# Patient Record
Sex: FEMALE
[De-identification: names, ages, dates, MRNs, and addresses within clinical notes are randomized per-mention and may not be internally consistent; named-entity substitution may affect disease eponyms.]

---

## 2017-11-24 ENCOUNTER — HOSPITAL ENCOUNTER (EMERGENCY)
Dept: HOSPITAL 5 - ED | Age: 57
LOS: 1 days | Discharge: HOME | End: 2017-11-25
Payer: COMMERCIAL

## 2017-11-24 DIAGNOSIS — M19.90: ICD-10-CM

## 2017-11-24 DIAGNOSIS — F17.200: ICD-10-CM

## 2017-11-24 DIAGNOSIS — J40: ICD-10-CM

## 2017-11-24 DIAGNOSIS — Z88.1: ICD-10-CM

## 2017-11-24 DIAGNOSIS — N39.0: Primary | ICD-10-CM

## 2017-11-24 DIAGNOSIS — G43.909: ICD-10-CM

## 2017-11-24 DIAGNOSIS — Z86.73: ICD-10-CM

## 2017-11-24 DIAGNOSIS — Z79.82: ICD-10-CM

## 2017-11-24 LAB
ANION GAP SERPL CALC-SCNC: 17 MMOL/L
BACTERIA #/AREA URNS HPF: (no result) /HPF
BASOPHILS NFR BLD AUTO: 0.8 % (ref 0–1.8)
BILIRUB UR QL STRIP: (no result)
BLOOD UR QL VISUAL: (no result)
BUN SERPL-MCNC: 10 MG/DL (ref 7–17)
BUN/CREAT SERPL: 14 %
CALCIUM SERPL-MCNC: 9.2 MG/DL (ref 8.4–10.2)
CHLORIDE SERPL-SCNC: 103.2 MMOL/L (ref 98–107)
CO2 SERPL-SCNC: 26 MMOL/L (ref 22–30)
EOSINOPHIL NFR BLD AUTO: 3.5 % (ref 0–4.3)
GLUCOSE SERPL-MCNC: 88 MG/DL (ref 65–100)
HCT VFR BLD CALC: 38.4 % (ref 30.3–42.9)
HGB BLD-MCNC: 13.2 GM/DL (ref 10.1–14.3)
KETONES UR STRIP-MCNC: (no result) MG/DL
LEUKOCYTE ESTERASE UR QL STRIP: (no result)
MCH RBC QN AUTO: 33 PG (ref 28–32)
MCHC RBC AUTO-ENTMCNC: 35 % (ref 30–34)
MCV RBC AUTO: 96 FL (ref 79–97)
MUCOUS THREADS #/AREA URNS HPF: (no result) /HPF
NITRITE UR QL STRIP: (no result)
PH UR STRIP: 6 [PH] (ref 5–7)
PLATELET # BLD: 352 K/MM3 (ref 140–440)
POTASSIUM SERPL-SCNC: 4.2 MMOL/L (ref 3.6–5)
PROT UR STRIP-MCNC: (no result) MG/DL
RBC # BLD AUTO: 4.01 M/MM3 (ref 3.65–5.03)
RBC #/AREA URNS HPF: 4 /HPF (ref 0–6)
SODIUM SERPL-SCNC: 142 MMOL/L (ref 137–145)
UROBILINOGEN UR-MCNC: 4 MG/DL (ref ?–2)
WBC # BLD AUTO: 10.3 K/MM3 (ref 4.5–11)
WBC #/AREA URNS HPF: 5 /HPF (ref 0–6)

## 2017-11-24 PROCEDURE — 84484 ASSAY OF TROPONIN QUANT: CPT

## 2017-11-24 PROCEDURE — 99284 EMERGENCY DEPT VISIT MOD MDM: CPT

## 2017-11-24 PROCEDURE — 82550 ASSAY OF CK (CPK): CPT

## 2017-11-24 PROCEDURE — 36415 COLL VENOUS BLD VENIPUNCTURE: CPT

## 2017-11-24 PROCEDURE — 85025 COMPLETE CBC W/AUTO DIFF WBC: CPT

## 2017-11-24 PROCEDURE — 87400 INFLUENZA A/B EACH AG IA: CPT

## 2017-11-24 PROCEDURE — 96374 THER/PROPH/DIAG INJ IV PUSH: CPT

## 2017-11-24 PROCEDURE — 71020: CPT

## 2017-11-24 PROCEDURE — 82553 CREATINE MB FRACTION: CPT

## 2017-11-24 PROCEDURE — 80048 BASIC METABOLIC PNL TOTAL CA: CPT

## 2017-11-24 PROCEDURE — 81001 URINALYSIS AUTO W/SCOPE: CPT

## 2017-11-24 PROCEDURE — 96375 TX/PRO/DX INJ NEW DRUG ADDON: CPT

## 2017-11-24 NOTE — EMERGENCY DEPARTMENT REPORT
- General


Chief Complaint: Nausea/Vomiting/Diarrhea


Stated Complaint: BODYACHES; DIZZINESS


Time Seen by Provider: 11/24/17 23:33


Source: patient


Mode of arrival: Ambulatory


Limitations: No Limitations





- History of Present Illness


Initial Comments: 





58 YO FEMALE  WHO WORKS AT A  AND HAD MANY SICK CONTACTS LAST WEEK IS 

TODAY C/O PRODUCTIVE YELLOW SPUTUM  COUGHING, SORE THROAT, AND DRY HEAVING. SHE 

HAS NOT ACTUALLY VOMITED. SHE HAS NO CHEST PAIN, NO FEVER BUT ADMITS TO BODY 

ACHES.





- Related Data


 Home Medications











 Medication  Instructions  Recorded  Confirmed  Last Taken


 


Aspirin EC [Aspirin Enteric Coated 81 mg PO QDAY 06/09/15 11/18/15 Unknown





TAB]    








 Previous Rx's











 Medication  Instructions  Recorded  Last Taken  Type


 


Acetaminophen/Codeine [Tylenol #3] 1 tab PO Q6H PRN #20 tab 02/22/16 Unknown Rx


 


Cyclobenzaprine [Flexeril] 10 mg PO TID PRN #20 tablet 02/22/16 Unknown Rx


 


Naproxen [Naprosyn TAB] 500 mg PO BID #30 tablet 02/22/16 Unknown Rx


 


Azithromycin [Zithromax Z-MODE] 250 mg PO QDAY #6 tablet 06/14/16 Unknown Rx


 


Ibuprofen [Motrin] 800 mg PO Q8HR PRN #10 tablet 06/14/16 Unknown Rx


 


Promethazine /Codeine 5 ml PO Q6H PRN #90 ml 06/14/16 Unknown Rx





[Phenergan/Codeine 6.25-10 mg/5Ml]    


 


Cyclobenzaprine HCl [Flexeril 5 MG 5 mg PO TID #20 tab 08/26/16 Unknown Rx





TAB]    


 


Ibuprofen [Motrin 800 MG tab] 800 mg PO Q8HR PRN #20 tablet 08/26/16 Unknown Rx


 


ALBUTEROL Inhaler [Proair] 2 puff IH QID PRN #1 inhalation 11/25/17 Unknown Rx


 


Levofloxacin [Levaquin] 750 mg PO QDAY #7 tablet 11/25/17 Unknown Rx











 Allergies











Allergy/AdvReac Type Severity Reaction Status Date / Time


 


ampicillin Allergy  Itching Verified 08/18/15 20:17














ED Review of Systems


ROS: 


Stated complaint: BODYACHES; DIZZINESS


Other details as noted in HPI





Constitutional: denies: chills, fever


Eyes: denies: eye pain, eye discharge, vision change


ENT: throat pain.  denies: ear pain


Respiratory: denies: cough, shortness of breath, wheezing


Cardiovascular: denies: chest pain, palpitations


Endocrine: no symptoms reported


Gastrointestinal: nausea.  denies: abdominal pain, vomiting, diarrhea


Genitourinary: denies: urgency, dysuria, discharge


Musculoskeletal: arthralgia, myalgia.  denies: joint swelling


Skin: denies: rash, lesions


Neurological: denies: headache, weakness, paresthesias


Psychiatric: denies: anxiety, depression


Hematological/Lymphatic: denies: easy bleeding, easy bruising





ED Past Medical Hx





- Past Medical History


Hx CVA: Yes (2010)


Hx Congestive Heart Failure: No


Hx Diabetes: No


Hx Arthritis: Yes


Hx Headaches / Migraines: Yes


Hx Psychiatric Treatment: Yes (depression)


Hx Asthma: No


Hx COPD: No


Hx HIV: No





- Surgical History


Hx Appendectomy: Yes


Additional Surgical History: hysterectomy, left ankle surgery, left carotid 

endarterectomy





- Social History


Smoking Status: Current Every Day Smoker


Substance Use Type: None





- Medications


Home Medications: 


 Home Medications











 Medication  Instructions  Recorded  Confirmed  Last Taken  Type


 


Aspirin EC [Aspirin Enteric Coated 81 mg PO QDAY 06/09/15 11/18/15 Unknown 

History





TAB]     


 


Acetaminophen/Codeine [Tylenol #3] 1 tab PO Q6H PRN #20 tab 02/22/16  Unknown Rx


 


Cyclobenzaprine [Flexeril] 10 mg PO TID PRN #20 tablet 02/22/16  Unknown Rx


 


Naproxen [Naprosyn TAB] 500 mg PO BID #30 tablet 02/22/16  Unknown Rx


 


Azithromycin [Zithromax Z-MODE] 250 mg PO QDAY #6 tablet 06/14/16  Unknown Rx


 


Ibuprofen [Motrin] 800 mg PO Q8HR PRN #10 tablet 06/14/16  Unknown Rx


 


Promethazine /Codeine 5 ml PO Q6H PRN #90 ml 06/14/16  Unknown Rx





[Phenergan/Codeine 6.25-10 mg/5Ml]     


 


Cyclobenzaprine HCl [Flexeril 5 MG 5 mg PO TID #20 tab 08/26/16  Unknown Rx





TAB]     


 


Ibuprofen [Motrin 800 MG tab] 800 mg PO Q8HR PRN #20 tablet 08/26/16  Unknown Rx


 


ALBUTEROL Inhaler [Proair] 2 puff IH QID PRN #1 inhalation 11/25/17  Unknown Rx


 


Levofloxacin [Levaquin] 750 mg PO QDAY #7 tablet 11/25/17  Unknown Rx














ED Physical Exam





- General


Limitations: No Limitations


General appearance: alert, in no apparent distress





- Head


Head exam: Present: atraumatic, normocephalic





- Eye


Eye exam: Present: normal appearance





- ENT


ENT exam: Present: mucous membranes moist





- Neck


Neck exam: Present: normal inspection, full ROM





- Respiratory


Respiratory exam: Present: normal lung sounds bilaterally.  Absent: respiratory 

distress, wheezes





- Cardiovascular


Cardiovascular Exam: Present: regular rate, normal rhythm, normal heart sounds.

  Absent: systolic murmur, diastolic murmur, rubs, gallop





- GI/Abdominal


GI/Abdominal exam: Present: soft, normal bowel sounds.  Absent: distended, 

tenderness, guarding





- Rectal


Rectal exam: Present: deferred





- Extremities Exam


Extremities exam: Present: normal inspection, full ROM





- Back Exam


Back exam: Present: normal inspection, full ROM





- Neurological Exam


Neurological exam: Present: alert, oriented X3, CN II-XII intact





- Psychiatric


Psychiatric exam: Present: normal affect, normal mood





- Skin


Skin exam: Present: warm, dry, intact, normal color.  Absent: rash





ED Course


 Vital Signs











  11/24/17 11/25/17 11/25/17





  21:12 00:30 00:31


 


Temperature 98 F 98 F 


 


Pulse Rate 74 98 H 


 


Respiratory 18 18 18





Rate   


 


Blood Pressure 139/82  


 


Blood Pressure  130/76 





[Left]   


 


O2 Sat by Pulse 100 96 





Oximetry   














- Reevaluation(s)


Reevaluation #1: 





11/25/17 01:31


FLU STILL UNCOLLECTED











ED Medical Decision Making





- Lab Data


Result diagrams: 


 11/24/17 21:29





 11/24/17 21:29





- Radiology Data


Radiology results: pending (CXR; NEGATIVE), image reviewed


Critical care attestation.: 


If time is entered above; I have spent that time in minutes in the direct care 

of this critically ill patient, excluding procedure time.








ED Disposition


Clinical Impression: 


 UTI (urinary tract infection)





Acute bronchitis


Qualifiers:


 Bronchitis organism: unspecified organism Qualified Code(s): J20.9 - Acute 

bronchitis, unspecified





Disposition: DC-01 TO HOME OR SELFCARE


Is pt being admited?: No


Does the pt Need Aspirin: No


Condition: Stable


Instructions:  Urinary Tract Infection in Women (ED), Acute Bronchitis (ED)


Prescriptions: 


ALBUTEROL Inhaler [Proair] 2 puff IH QID PRN #1 inhalation


 PRN Reason: Shortness Of Breath


Levofloxacin [Levaquin] 750 mg PO QDAY #7 tablet


Referrals: 


PRIMARY CARE,MD [Primary Care Provider] - 3-5 Days


[facility] [Outside] - 3-5 Days


Time of Disposition: 02:20

## 2017-11-25 VITALS — SYSTOLIC BLOOD PRESSURE: 137 MMHG | DIASTOLIC BLOOD PRESSURE: 89 MMHG

## 2017-11-25 LAB — CK SERPL-CCNC: 208 UNITS/L (ref 30–135)

## 2017-11-25 NOTE — XRAY REPORT
FINAL REPORT



EXAM:  XR CHEST ROUTINE 2V



HISTORY:  COUGHING?PNEUMONIA 



TECHNIQUE:  PA and lateral views of the chest were submitted.

There are no previous studies available for comparison.



FINDINGS:  

Heart size and mediastinum appear normal. The lungs are clear.

Pleural fluid is not seen. The bones soft tissues are well

maintained.



IMPRESSION:  

No active chest disease.

## 2017-12-10 ENCOUNTER — HOSPITAL ENCOUNTER (EMERGENCY)
Dept: HOSPITAL 5 - ED | Age: 57
Discharge: HOME | End: 2017-12-10
Payer: COMMERCIAL

## 2017-12-10 VITALS — DIASTOLIC BLOOD PRESSURE: 82 MMHG | SYSTOLIC BLOOD PRESSURE: 136 MMHG

## 2017-12-10 DIAGNOSIS — M25.562: ICD-10-CM

## 2017-12-10 DIAGNOSIS — N30.00: Primary | ICD-10-CM

## 2017-12-10 DIAGNOSIS — Z79.82: ICD-10-CM

## 2017-12-10 DIAGNOSIS — M19.90: ICD-10-CM

## 2017-12-10 DIAGNOSIS — Z86.73: ICD-10-CM

## 2017-12-10 DIAGNOSIS — F17.200: ICD-10-CM

## 2017-12-10 DIAGNOSIS — M25.522: ICD-10-CM

## 2017-12-10 DIAGNOSIS — G43.909: ICD-10-CM

## 2017-12-10 DIAGNOSIS — E11.40: ICD-10-CM

## 2017-12-10 DIAGNOSIS — M25.521: ICD-10-CM

## 2017-12-10 DIAGNOSIS — M25.561: ICD-10-CM

## 2017-12-10 DIAGNOSIS — Z88.1: ICD-10-CM

## 2017-12-10 LAB
BACTERIA #/AREA URNS HPF: (no result) /HPF
BILIRUB UR QL STRIP: (no result)
BLOOD UR QL VISUAL: (no result)
KETONES UR STRIP-MCNC: (no result) MG/DL
LEUKOCYTE ESTERASE UR QL STRIP: (no result)
NITRITE UR QL STRIP: (no result)
PH UR STRIP: 6 [PH] (ref 5–7)
PROT UR STRIP-MCNC: (no result) MG/DL
RBC #/AREA URNS HPF: 1 /HPF (ref 0–6)
URINE DRUGS OF ABUSE NOTE: (no result)
UROBILINOGEN UR-MCNC: < 2 MG/DL (ref ?–2)
WBC #/AREA URNS HPF: 3 /HPF (ref 0–6)

## 2017-12-10 PROCEDURE — 96372 THER/PROPH/DIAG INJ SC/IM: CPT

## 2017-12-10 PROCEDURE — 99283 EMERGENCY DEPT VISIT LOW MDM: CPT

## 2017-12-10 PROCEDURE — 81001 URINALYSIS AUTO W/SCOPE: CPT

## 2017-12-10 PROCEDURE — 80307 DRUG TEST PRSMV CHEM ANLYZR: CPT

## 2017-12-10 NOTE — EMERGENCY DEPARTMENT REPORT
ED General Adult HPI





- General


Chief complaint: Pain General


Stated complaint: KNEE PAIN


Time Seen by Provider: 12/10/17 16:13


Source: patient


Mode of arrival: Ambulatory


Limitations: No Limitations





- History of Present Illness


Initial comments: 





Patient here complaining in that she has continued ongoing chronic pain to her 

back, neck, elbows, legs, knees with occasional tingling .  Patient has chronic 

neuropathy and she says she has very bad arthritis with osteoporosis.  She said 

she started having pain a couple days it's been getting worse pain is 10 out of 

10 generalized to her joints.  Denies any trauma.  Patient was here on 2017 and treated for urinary tract infection which she says she took all the 

medication that she was given for 7 days.  Denies any urinary burning, 

frequency or urgency.  Denies any nausea or vomiting.  Denies any fever or 

chills.  Pain is 10 out of 10 to joints and aching.  Better with resting.  

Worse with moving around.  She has no fever or chills.


MD Complaint: generalized aching 


Onset/Timin


-: days(s)


Location: back, left, right, upper extremity, lower extremity


Radiation: non-radiation


Severity scale (0 -10): 10


Quality: aching


Consistency: constant


Improves with: immobilization, rest


Worsens with: movement


Associated Symptoms: denies: confusion, chest pain, cough, diaphoresis, fever/

chills, headaches, loss of appetite, malaise, nausea/vomiting, rash, seizure, 

shortness of breath, syncope, weakness


Treatments Prior to Arrival: none





- Related Data


 Home Medications











 Medication  Instructions  Recorded  Confirmed  Last Taken


 


Aspirin EC [Aspirin Enteric Coated 81 mg PO QDAY 06/09/15 11/18/15 Unknown





TAB]    








 Previous Rx's











 Medication  Instructions  Recorded  Last Taken  Type


 


Acetaminophen/Codeine [Tylenol #3] 1 tab PO Q6H PRN #20 tab 16 Unknown Rx


 


Cyclobenzaprine [Flexeril] 10 mg PO TID PRN #20 tablet 16 Unknown Rx


 


Naproxen [Naprosyn TAB] 500 mg PO BID #30 tablet 16 Unknown Rx


 


Azithromycin [Zithromax Z-MODE] 250 mg PO QDAY #6 tablet 16 Unknown Rx


 


Ibuprofen [Motrin] 800 mg PO Q8HR PRN #10 tablet 16 Unknown Rx


 


Promethazine /Codeine 5 ml PO Q6H PRN #90 ml 16 Unknown Rx





[Phenergan/Codeine 6.25-10 mg/5Ml]    


 


Cyclobenzaprine HCl [Flexeril 5 MG 5 mg PO TID #20 tab 16 Unknown Rx





TAB]    


 


Ibuprofen [Motrin 800 MG tab] 800 mg PO Q8HR PRN #20 tablet 16 Unknown Rx


 


ALBUTEROL Inhaler [Proair] 2 puff IH QID PRN #1 inhalation 17 Unknown Rx


 


Levofloxacin [Levaquin] 750 mg PO QDAY #7 tablet 17 Unknown Rx


 


Nitrofurantoin Monohyd/M-Cryst 100 mg PO Q12H 3 Days #6 capsule 12/10/17 

Unknown Rx





[Macrobid 100 mg Capsule]    


 


traMADol [Ultram] 50 mg PO Q6HR PRN 3 Days #12 tablet 12/10/17 Unknown Rx











 Allergies











Allergy/AdvReac Type Severity Reaction Status Date / Time


 


ampicillin Allergy  Itching Verified 08/18/15 20:17














ED Review of Systems


ROS: 


Stated complaint: KNEE PAIN


Other details as noted in HPI





Comment: All other systems reviewed and negative


Constitutional: no symptoms reported


Respiratory: no symptoms reported


Cardiovascular: denies: chest pain, palpitations, dyspnea on exertion, orthopnea

, edema, syncope, paroxysmal nocturnal dyspnea


Endocrine: no symptoms reported


Gastrointestinal: denies: abdominal pain, nausea, vomiting, diarrhea


Genitourinary: denies: urgency, dysuria, frequency, hematuria, discharge, 

abnormal menses


Musculoskeletal: back pain, arthralgia.  denies: joint swelling, myalgia


Skin: denies: rash, lesions, pruritus


Neurological: paresthesias.  denies: headache, weakness, numbness, confusion, 

abnormal gait, vertigo





ED Past Medical Hx





- Past Medical History


Previous Medical History?: Yes


Hx CVA: Yes ()


Hx Congestive Heart Failure: No


Hx Diabetes: No


Hx Arthritis: Yes


Hx Headaches / Migraines: Yes


Hx Psychiatric Treatment: Yes (depression)


Hx Asthma: No


Hx COPD: No


Hx HIV: No





- Surgical History


Past Surgical History?: Yes


Hx Appendectomy: Yes


Additional Surgical History: hysterectomy, left ankle surgery, left carotid 

endarterectomy





- Family History


Family history: hypertension





- Social History


Smoking Status: Current Every Day Smoker


Substance Use Type: None





- Medications


Home Medications: 


 Home Medications











 Medication  Instructions  Recorded  Confirmed  Last Taken  Type


 


Aspirin EC [Aspirin Enteric Coated 81 mg PO QDAY 06/09/15 11/18/15 Unknown 

History





TAB]     


 


Acetaminophen/Codeine [Tylenol #3] 1 tab PO Q6H PRN #20 tab 16  Unknown Rx


 


Cyclobenzaprine [Flexeril] 10 mg PO TID PRN #20 tablet 16  Unknown Rx


 


Naproxen [Naprosyn TAB] 500 mg PO BID #30 tablet 16  Unknown Rx


 


Azithromycin [Zithromax Z-MODE] 250 mg PO QDAY #6 tablet 16  Unknown Rx


 


Ibuprofen [Motrin] 800 mg PO Q8HR PRN #10 tablet 16  Unknown Rx


 


Promethazine /Codeine 5 ml PO Q6H PRN #90 ml 16  Unknown Rx





[Phenergan/Codeine 6.25-10 mg/5Ml]     


 


Cyclobenzaprine HCl [Flexeril 5 MG 5 mg PO TID #20 tab 16  Unknown Rx





TAB]     


 


Ibuprofen [Motrin 800 MG tab] 800 mg PO Q8HR PRN #20 tablet 16  Unknown Rx


 


ALBUTEROL Inhaler [Proair] 2 puff IH QID PRN #1 inhalation 17  Unknown Rx


 


Levofloxacin [Levaquin] 750 mg PO QDAY #7 tablet 17  Unknown Rx


 


Nitrofurantoin Monohyd/M-Cryst 100 mg PO Q12H 3 Days #6 capsule 12/10/17  

Unknown Rx





[Macrobid 100 mg Capsule]     


 


traMADol [Ultram] 50 mg PO Q6HR PRN 3 Days #12 tablet 12/10/17  Unknown Rx














ED Physical Exam





- General


Limitations: No Limitations


General appearance: alert, in no apparent distress





- Head


Head exam: Present: atraumatic, normocephalic, normal inspection





- Eye


Eye exam: Present: normal appearance, PERRL, EOMI.  Absent: periorbital swelling

, periorbital tenderness


Pupils: Present: normal accommodation





- ENT


ENT exam: Present: normal exam, normal orophraynx, mucous membranes moist





- Neck


Neck exam: Present: normal inspection, full ROM, other (no C-spine tenderness).

  Absent: tenderness, meningismus, lymphadenopathy, thyromegaly





- Respiratory


Respiratory exam: Present: normal lung sounds bilaterally.  Absent: respiratory 

distress, chest wall tenderness, accessory muscle use





- Cardiovascular


Cardiovascular Exam: Present: regular rate, normal rhythm, normal heart sounds.

  Absent: systolic murmur, diastolic murmur





- GI/Abdominal


GI/Abdominal exam: Present: soft, normal bowel sounds.  Absent: distended, 

tenderness, guarding, rebound, rigid, organomegaly, mass, bruit, pulsatile mass

, hernia





- Extremities Exam


Extremities exam: Present: normal inspection, full ROM, normal capillary refill

, other (MSK: Strength 5/5 in all extremities.  No joint deformity or crepitus.

  Normal inspection.  Full range of motion to all extremities).  Absent: 

tenderness, pedal edema, joint swelling, calf tenderness





- Back Exam


Back exam: Present: normal inspection, full ROM, other (patient ambulates 

without any difficulties).  Absent: tenderness, CVA tenderness (R), CVA 

tenderness (L), muscle spasm, paraspinal tenderness, vertebral tenderness, rash 

noted





- Neurological Exam


Neurological exam: Present: alert, oriented X3, normal gait, reflexes normal.  

Absent: motor sensory deficit





- Psychiatric


Psychiatric exam: Present: normal affect, normal mood





- Skin


Skin exam: Present: warm, dry, intact, normal color.  Absent: rash





ED Course


 Vital Signs











  12/10/17





  13:49


 


Temperature 97.7 F


 


Pulse Rate 87


 


Respiratory 18





Rate 


 


Blood Pressure 136/82


 


O2 Sat by Pulse 99





Oximetry 














- Reevaluation(s)


Reevaluation #1: 





12/10/17 18:00


 given Toradol 30 mg IM in emergency room for generalized joint pain.  Her 

family members here so she will be given additional Percocet 5/325 2 tablets.














ED Medical Decision Making





- Lab Data








 Lab Results











  12/10/17 12/10/17 Range/Units





  14:39 14:39 


 


Urine Color  Yellow   (Yellow)  


 


Urine Turbidity  Clear   (Clear)  


 


Urine pH  6.0   (5.0-7.0)  


 


Ur Specific Gravity  1.014   (1.003-1.030)  


 


Urine Protein  <15 mg/dl   (Negative)  mg/dL


 


Urine Glucose (UA)  Neg   (Negative)  mg/dL


 


Urine Ketones  Neg   (Negative)  mg/dL


 


Urine Blood  Neg   (Negative)  


 


Urine Nitrite  Neg   (Negative)  


 


Ur Reducing Substances  Not Reportable   


 


Urine Bilirubin  Neg   (Negative)  


 


Urine Ictotest  Not Reportable   


 


Urine Urobilinogen  < 2.0   (<2.0)  mg/dL


 


Ur Leukocyte Esterase  Sm   (Negative)  


 


Urine WBC (Auto)  3.0   (0.0-6.0)  /HPF


 


Urine RBC (Auto)  1.0   (0.0-6.0)  /HPF


 


U Epithel Cells (Auto)  3.0   (0-13.0)  /HPF


 


Urine Bacteria (Auto)  1+   (Negative)  /HPF


 


Urine Opiates Screen   Presumptive negative  


 


Urine Methadone Screen   Presumptive negative  


 


Ur Barbiturates Screen   Presumptive negative  


 


Ur Phencyclidine Scrn   Presumptive negative  


 


Ur Amphetamines Screen   Presumptive negative  


 


U Benzodiazepines Scrn   Presumptive negative  


 


Urine Cocaine Screen   Presumptive negative  


 


U Marijuana (THC) Screen   Presumptive negative  


 


Drugs of Abuse Note   Disclamer  








Urine culture pending





- Medical Decision Making





ED course: For management of her chronic pain.  She says she has chronic 

arthritis and she is having a flareup and that she has osteoporosis.  She was 

treated here on 2017 for urinary tract infection and she is asymptomatic 

at present but her urine shows that she has some bacteria and trace amount of 

leukocyte Estrace.  Based on this I will treat her with Macrobid 3 days and I 

gave her Toradol 30 mg IM in emergency room.  Her family member came and I gave 

her additional Percocet 5/325 2 tablets emergency room and discharged home with 

prescription for Ultram and Macrobid to follow up with  her primary care 

physician to manage her chronic medical problems.  This was discussed patient.  

I gave her results of urinalysis and told her that culture was sent.  Patient 

discharged home with her family in stable condition and discharged home and 

referred to [facility] at her request for primary care.


Critical care attestation.: 


If time is entered above; I have spent that time in minutes in the direct care 

of this critically ill patient, excluding procedure time.








ED Disposition


Clinical Impression: 


 Acute cystitis without hematuria, Arthralgia of multiple sites, bilateral





Disposition: DC-01 TO HOME OR SELFCARE


Is pt being admited?: No


Does the pt Need Aspirin: No


Condition: Stable


Instructions:  Chronic Pain (ED), Osteoarthritis (ED), Urinary Tract Infection 

in Children (ED)


Additional Instructions: 


 follow-up with outside Medical Center as instructed


Please increase your fluid intake


Take Ultram for pain but please do not drive or operate heavy machinery as this 

medication causes drowsiness


Take  Macrobid for small bladder infection


Prescriptions: 


Nitrofurantoin Monohyd/M-Cryst [Macrobid 100 mg Capsule] 100 mg PO Q12H 3 Days #

6 capsule


traMADol [Ultram] 50 mg PO Q6HR PRN 3 Days #12 tablet


 PRN Reason: Pain


Referrals: 


PRIMARY CARE,MD [Primary Care Provider] - 2-3 Days


[facility] [Outside] - 2-3 Days


Forms:  Accompanied Note, Work/School Release Form(ED)

## 2018-08-22 ENCOUNTER — HOSPITAL ENCOUNTER (EMERGENCY)
Dept: HOSPITAL 5 - ED | Age: 58
Discharge: HOME | End: 2018-08-22
Payer: SELF-PAY

## 2018-08-22 VITALS — SYSTOLIC BLOOD PRESSURE: 157 MMHG | DIASTOLIC BLOOD PRESSURE: 87 MMHG

## 2018-08-22 DIAGNOSIS — F17.200: ICD-10-CM

## 2018-08-22 DIAGNOSIS — J40: Primary | ICD-10-CM

## 2018-08-22 DIAGNOSIS — M19.90: ICD-10-CM

## 2018-08-22 DIAGNOSIS — Z90.710: ICD-10-CM

## 2018-08-22 DIAGNOSIS — F32.9: ICD-10-CM

## 2018-08-22 DIAGNOSIS — Z88.0: ICD-10-CM

## 2018-08-22 DIAGNOSIS — G43.909: ICD-10-CM

## 2018-08-22 DIAGNOSIS — Z86.73: ICD-10-CM

## 2018-08-22 PROCEDURE — 93005 ELECTROCARDIOGRAM TRACING: CPT

## 2018-08-22 PROCEDURE — 99283 EMERGENCY DEPT VISIT LOW MDM: CPT

## 2018-08-22 PROCEDURE — 71046 X-RAY EXAM CHEST 2 VIEWS: CPT

## 2018-08-22 PROCEDURE — 93010 ELECTROCARDIOGRAM REPORT: CPT

## 2018-08-22 NOTE — XRAY REPORT
FINAL REPORT



EXAM:  XR CHEST ROUTINE 2V



HISTORY:  cough 



TECHNIQUE:  2 view examination of the chest



PRIORS:  11/24/2017



FINDINGS:  

Atherosclerotic change in the thoracic aorta.  Degenerative

change in the thoracic spine.  There is no visible pulmonary

consolidation, pleural effusion, or pneumothorax.  Cardiac

silhouette size is normal without vascular congestion.



IMPRESSION:  

No evidence of acute cardiopulmonary disease

## 2018-08-22 NOTE — EMERGENCY DEPARTMENT REPORT
- General


Chief Complaint: Upper Respiratory Infection


Stated Complaint: SNEEZING,COUGHING,CHEST PAIN


Time Seen by Provider: 08/22/18 15:38


Source: patient


Mode of arrival: Ambulatory


Limitations: No Limitations





- History of Present Illness


Initial Comments: 





This is a 58-year-old female nontoxic, well nourished in appearance, no acute 

signs of distress presents to the ED with c/o of productive cough, rhinorrhea, 

nasal congestion x3 days.  Patient describes productive cough as yellow mucus 

production.  Patient denies any sick contact.  Patient denies any recent travels

, long car, recent hospital stays.  Patient denies any calf pain or calf 

tenderness.  Patient denies any chest pain, short of breath, fever, chills, 

nausea, vomiting, hemoptysis, numbness, tingling, headache or stiff neck.  

Patient stated allergies to ampicillin.  PMH includes DM, HTN.


MD Complaint: cough, sore throat, rhinorrhea, nasal congestion


-: days(s) (3)


Severity: mild


Consistency: constant


Improves With: nothing


Worsens With: nothing


Associated Symptoms: rhinorrhea, nasal congestion, cough.  denies: fever, chills

, myalgias, diaphoresis, headache, sore throat, stiff neck, chest pain, 

shortness of breath, abdominal pain, nausea, vomiting, diarrhea, dysuria, rash, 

confusion, right sweats, weight loss, epistaxis, hoarseness, ear pain





- Related Data


 Home Medications











 Medication  Instructions  Recorded  Confirmed  Last Taken


 


Aspirin EC [Aspirin Enteric Coated 81 mg PO QDAY 06/09/15 11/18/15 Unknown





TAB]    








 Previous Rx's











 Medication  Instructions  Recorded  Last Taken  Type


 


Acetaminophen/Codeine [Tylenol #3] 1 tab PO Q6H PRN #20 tab 02/22/16 Unknown Rx


 


Cyclobenzaprine [Flexeril] 10 mg PO TID PRN #20 tablet 02/22/16 Unknown Rx


 


Naproxen [Naprosyn TAB] 500 mg PO BID #30 tablet 02/22/16 Unknown Rx


 


Azithromycin [Zithromax Z-MODE] 250 mg PO QDAY #6 tablet 06/14/16 Unknown Rx


 


Ibuprofen [Motrin] 800 mg PO Q8HR PRN #10 tablet 06/14/16 Unknown Rx


 


Promethazine /Codeine 5 ml PO Q6H PRN #90 ml 06/14/16 Unknown Rx





[Phenergan/Codeine 6.25-10 mg/5Ml]    


 


Cyclobenzaprine HCl [Flexeril 5 MG 5 mg PO TID #20 tab 08/26/16 Unknown Rx





TAB]    


 


Ibuprofen [Motrin 800 MG tab] 800 mg PO Q8HR PRN #20 tablet 08/26/16 Unknown Rx


 


ALBUTEROL Inhaler [Proair] 2 puff IH QID PRN #1 inhalation 11/25/17 Unknown Rx


 


levoFLOXacin [Levaquin] 750 mg PO QDAY #7 tablet 11/25/17 Unknown Rx


 


Nitrofurantoin Monohyd/M-Cryst 100 mg PO Q12H 3 Days #6 capsule 12/10/17 

Unknown Rx





[Macrobid 100 mg Capsule]    


 


traMADol [Ultram] 50 mg PO Q6HR PRN 3 Days #12 tablet 12/10/17 Unknown Rx


 


Azithromycin [Zithromax Z-MODE] 250 mg PO DAILY #6 tablet 08/22/18 Unknown Rx


 


Benzonatate [Tessalon Perle] 100 mg PO Q8H PRN #20 capsule 08/22/18 Unknown Rx


 


Loratadine [Claritin] 10 mg PO DAILY #30 tablet 08/22/18 Unknown Rx











 Allergies











Allergy/AdvReac Type Severity Reaction Status Date / Time


 


ampicillin Allergy  Itching Verified 08/18/15 20:17














ED Review of Systems


ROS: 


Stated complaint: SNEEZING,COUGHING,CHEST PAIN


Other details as noted in HPI





Constitutional: denies: chills, fever


Eyes: denies: eye pain, eye discharge, vision change


ENT: denies: ear pain, throat pain


Respiratory: cough.  denies: shortness of breath, wheezing


Cardiovascular: denies: chest pain, palpitations


Endocrine: no symptoms reported


Gastrointestinal: denies: abdominal pain, nausea, diarrhea


Genitourinary: denies: urgency, dysuria, discharge


Musculoskeletal: denies: back pain, joint swelling, arthralgia


Skin: denies: rash, lesions


Neurological: denies: headache, weakness, paresthesias


Psychiatric: denies: anxiety, depression


Hematological/Lymphatic: denies: easy bleeding, easy bruising





ED Past Medical Hx





- Past Medical History


Hx CVA: Yes (2010)


Hx Congestive Heart Failure: No


Hx Diabetes: No


Hx Arthritis: Yes


Hx Headaches / Migraines: Yes


Hx Psychiatric Treatment: Yes (depression)


Hx Asthma: No


Hx COPD: No


Hx HIV: No





- Surgical History


Hx Appendectomy: Yes


Additional Surgical History: hysterectomy, left ankle surgery, left carotid 

endarterectomy





- Social History


Smoking Status: Current Every Day Smoker


Substance Use Type: None





- Medications


Home Medications: 


 Home Medications











 Medication  Instructions  Recorded  Confirmed  Last Taken  Type


 


Aspirin EC [Aspirin Enteric Coated 81 mg PO QDAY 06/09/15 11/18/15 Unknown 

History





TAB]     


 


Acetaminophen/Codeine [Tylenol #3] 1 tab PO Q6H PRN #20 tab 02/22/16  Unknown Rx


 


Cyclobenzaprine [Flexeril] 10 mg PO TID PRN #20 tablet 02/22/16  Unknown Rx


 


Naproxen [Naprosyn TAB] 500 mg PO BID #30 tablet 02/22/16  Unknown Rx


 


Azithromycin [Zithromax Z-MODE] 250 mg PO QDAY #6 tablet 06/14/16  Unknown Rx


 


Ibuprofen [Motrin] 800 mg PO Q8HR PRN #10 tablet 06/14/16  Unknown Rx


 


Promethazine /Codeine 5 ml PO Q6H PRN #90 ml 06/14/16  Unknown Rx





[Phenergan/Codeine 6.25-10 mg/5Ml]     


 


Cyclobenzaprine HCl [Flexeril 5 MG 5 mg PO TID #20 tab 08/26/16  Unknown Rx





TAB]     


 


Ibuprofen [Motrin 800 MG tab] 800 mg PO Q8HR PRN #20 tablet 08/26/16  Unknown Rx


 


ALBUTEROL Inhaler [Proair] 2 puff IH QID PRN #1 inhalation 11/25/17  Unknown Rx


 


levoFLOXacin [Levaquin] 750 mg PO QDAY #7 tablet 11/25/17  Unknown Rx


 


Nitrofurantoin Monohyd/M-Cryst 100 mg PO Q12H 3 Days #6 capsule 12/10/17  

Unknown Rx





[Macrobid 100 mg Capsule]     


 


traMADol [Ultram] 50 mg PO Q6HR PRN 3 Days #12 tablet 12/10/17  Unknown Rx


 


Azithromycin [Zithromax Z-MODE] 250 mg PO DAILY #6 tablet 08/22/18  Unknown Rx


 


Benzonatate [Tessalon Perle] 100 mg PO Q8H PRN #20 capsule 08/22/18  Unknown Rx


 


Loratadine [Claritin] 10 mg PO DAILY #30 tablet 08/22/18  Unknown Rx














ED Physical Exam





- General


Limitations: No Limitations


General appearance: alert, in no apparent distress





- Head


Head exam: Present: atraumatic, normocephalic





- Eye


Eye exam: Present: normal appearance


Pupils: Present: normal accommodation





- ENT


ENT exam: Present: normal exam, mucous membranes moist





- Neck


Neck exam: Present: normal inspection, full ROM.  Absent: tenderness, 

meningismus, lymphadenopathy





- Respiratory


Respiratory exam: Present: normal lung sounds bilaterally.  Absent: respiratory 

distress, wheezes, rales, rhonchi, stridor, chest wall tenderness, accessory 

muscle use, decreased breath sounds, prolonged expiratory





- Cardiovascular


Cardiovascular Exam: Present: regular rate, normal rhythm, normal heart sounds.

  Absent: bradycardia, tachycardia, irregular rhythm, systolic murmur, 

diastolic murmur, rubs, gallop





- GI/Abdominal


GI/Abdominal exam: Present: soft, normal bowel sounds.  Absent: distended, 

tenderness, guarding, rebound, rigid, diminished bowel sounds





- Extremities Exam


Extremities exam: Present: normal inspection, full ROM, normal capillary 

refill.  Absent: tenderness





- Back Exam


Back exam: Present: normal inspection, full ROM.  Absent: tenderness, CVA 

tenderness (R), CVA tenderness (L), muscle spasm, paraspinal tenderness, 

vertebral tenderness, rash noted





- Neurological Exam


Neurological exam: Present: alert, oriented X3, normal gait





- Psychiatric


Psychiatric exam: Present: normal affect, normal mood





- Skin


Skin exam: Present: warm, dry, intact, normal color.  Absent: rash





ED Course





 Vital Signs











  08/22/18





  13:19


 


Temperature 97.9 F


 


Pulse Rate 82


 


Respiratory 16





Rate 


 


Blood Pressure 156/92


 


O2 Sat by Pulse 96





Oximetry 














- Reevaluation(s)


Reevaluation #1: 





08/22/18 16:43


Patient is speaking in full sentences with no signs of distress noted.





ED Medical Decision Making





- Medical Decision Making





This is a 58-year-old female that presents with bronchitis.  Patient is stable 

and was examined by me.  Chest x-ray has been obtained and dictated by 

radiologist with normal exam.  Patient is notified of x-ray results with no 

questions noted. Due to patient having symptoms of upper respiratory infection 

and worsening I will treat patient empirically with zpak.   Patient was 

instructed to increase hydration, rest and take Motrin for fever episodes.  

Patient received tesslone perrls in the ED.  Vitals stable. Patient is 

nonfebrile and normal heart rate. Patient was instructed Follow-up with a 

primary care doctor in 3-5 days or if symptoms worsen and continue return to 

emergency room as soon as possible.  At time time of discharge, the patient 

does not seem toxic or ill in appearance.  No acute signs of distress noted.  

Patient agrees to discharge treatment plan of care.  No further questions noted 

by the patient.


Critical care attestation.: 


If time is entered above; I have spent that time in minutes in the direct care 

of this critically ill patient, excluding procedure time.








ED Disposition


Clinical Impression: 


 Bronchitis





Disposition: DC-01 TO HOME OR SELFCARE


Is pt being admited?: No


Does the pt Need Aspirin: No


Condition: Stable


Instructions:  Acute Bronchitis (ED)


Additional Instructions: 


Follow-up with a primary care doctor in 3-5 days or if symptoms worsen and 

continue return to emergency room as soon as possible. 


Prescriptions: 


Azithromycin [Zithromax Z-MODE] 250 mg PO DAILY #6 tablet


Benzonatate [Tessalon Perle] 100 mg PO Q8H PRN #20 capsule


 PRN Reason: Cough


Loratadine [Claritin] 10 mg PO DAILY #30 tablet


Referrals: 


PRIMARY MD HARRY [Primary Care Provider] - 3-5 Days


SRINIVAS KENYON MD [Staff Physician] - 3-5 Days


[facility] [Outside] - 3-5 Days


[facility] [Outside] - 3-5 Days


Forms:  Work/School Release Form(ED)

## 2019-02-25 ENCOUNTER — HOSPITAL ENCOUNTER (EMERGENCY)
Dept: HOSPITAL 5 - ED | Age: 59
LOS: 1 days | Discharge: HOME | End: 2019-02-26
Payer: COMMERCIAL

## 2019-02-25 VITALS — DIASTOLIC BLOOD PRESSURE: 71 MMHG | SYSTOLIC BLOOD PRESSURE: 104 MMHG

## 2019-02-25 DIAGNOSIS — G43.909: ICD-10-CM

## 2019-02-25 DIAGNOSIS — Z86.73: ICD-10-CM

## 2019-02-25 DIAGNOSIS — F32.9: ICD-10-CM

## 2019-02-25 DIAGNOSIS — Z90.710: ICD-10-CM

## 2019-02-25 DIAGNOSIS — F17.200: ICD-10-CM

## 2019-02-25 DIAGNOSIS — Z88.1: ICD-10-CM

## 2019-02-25 DIAGNOSIS — K14.0: Primary | ICD-10-CM

## 2019-02-25 DIAGNOSIS — Z79.899: ICD-10-CM

## 2019-02-25 PROCEDURE — 99282 EMERGENCY DEPT VISIT SF MDM: CPT

## 2019-02-25 NOTE — EMERGENCY DEPARTMENT REPORT
Blank Doc





- Documentation


Documentation: 





This is a 58-year-old female that presents with right sided tongue pain.  Stated

cut herself by a broken tooth.





This initial assessment diagnostic orders/clinical plan/treatment(s) is/are 

subject to change based on patient's health status, clinical progression and 

re-assessment by fellow clinical providers in the ED.  Further treatment and 

workup at subsequent clinical providers discretion.  Patient/guardians urged not

to elope from ED s their condition may be serious if not clinically assessed and

managed.  Initial orders include:


1-Patient sent to [facility] for further evaluation and treatment

## 2019-02-26 NOTE — EMERGENCY DEPARTMENT REPORT
ED ENT HPI





- General


Chief complaint: Dental/Oral


Stated complaint: TOOTHACHE


Time Seen by Provider: 02/25/19 20:22


Source: patient


Mode of arrival: Ambulatory


Limitations: No Limitations





- History of Present Illness


Initial comments: 





58-year-old  female presents to the emergency room for bottom right 

tooth chipped and patient is now having pain to the tongue where it has been 

rubbing against her tooth.  Patient reports that she's tried using 

over-the-counter benzocaine liquid oral gel mouthwash and ibuprofen without much

relief.  Patient reports a past medical history arthritis CVA in 2010 

depression.


MD complaint: other (sore tongue)


-: days(s) (3)


Location: other (tongue)


Severity: severe


Severity scale (0 -10): 10


Quality: burning, constant


Consistency: constant


Improves with: none


Worsens with: swallowing, eating





- Related Data


                                Home Medications











 Medication  Instructions  Recorded  Confirmed  Last Taken


 


Aspirin EC [Aspirin Enteric Coated 81 mg PO QDAY 06/09/15 11/18/15 Unknown





TAB]    








                                  Previous Rx's











 Medication  Instructions  Recorded  Last Taken  Type


 


Acetaminophen/Codeine [Tylenol #3] 1 tab PO Q6H PRN #20 tab 02/22/16 Unknown Rx


 


Cyclobenzaprine [Flexeril] 10 mg PO TID PRN #20 tablet 02/22/16 Unknown Rx


 


Naproxen [Naprosyn TAB] 500 mg PO BID #30 tablet 02/22/16 Unknown Rx


 


Azithromycin [Zithromax Z-MODE] 250 mg PO QDAY #6 tablet 06/14/16 Unknown Rx


 


Ibuprofen [Motrin] 800 mg PO Q8HR PRN #10 tablet 06/14/16 Unknown Rx


 


Promethazine /Codeine 5 ml PO Q6H PRN #90 ml 06/14/16 Unknown Rx





[Phenergan/Codeine 6.25-10 mg/5Ml]    


 


Cyclobenzaprine HCl [Flexeril 5 MG 5 mg PO TID #20 tab 08/26/16 Unknown Rx





TAB]    


 


Ibuprofen [Motrin 800 MG tab] 800 mg PO Q8HR PRN #20 tablet 08/26/16 Unknown Rx


 


ALBUTEROL Inhaler (OR & NICU) 2 puff IH QID PRN #1 inhalation 11/25/17 Unknown 

Rx





[Proair]    


 


levoFLOXacin [Levaquin] 750 mg PO QDAY #7 tablet 11/25/17 Unknown Rx


 


Nitrofurantoin Monohyd/M-Cryst 100 mg PO Q12H 3 Days #6 capsule 12/10/17 Unknown

 Rx





[Macrobid 100 mg Capsule]    


 


Azithromycin [Zithromax Z-MODE] 250 mg PO DAILY #6 tablet 08/22/18 Unknown Rx


 


Benzonatate [Tessalon Perle] 100 mg PO Q8H PRN #20 capsule 08/22/18 Unknown Rx


 


Loratadine [Claritin] 10 mg PO DAILY #30 tablet 08/22/18 Unknown Rx


 


Ibuprofen [Motrin] 600 mg PO Q8H PRN #20 tablet 11/12/18 Unknown Rx


 


traMADol [Ultram 50 MG tab] 50 mg PO Q6HR PRN 3 Days #12 tablet 02/26/19 Unknown

Rx











                                    Allergies











Allergy/AdvReac Type Severity Reaction Status Date / Time


 


ampicillin Allergy  Itching Verified 08/18/15 20:17














ED Dental HPI





- General


Chief complaint: Dental/Oral


Stated complaint: TOOTHACHE


Time Seen by Provider: 02/25/19 20:22


Source: patient


Mode of arrival: Ambulatory


Limitations: No Limitations





- Related Data


                                Home Medications











 Medication  Instructions  Recorded  Confirmed  Last Taken


 


Aspirin EC [Aspirin Enteric Coated 81 mg PO QDAY 06/09/15 11/18/15 Unknown





TAB]    








                                  Previous Rx's











 Medication  Instructions  Recorded  Last Taken  Type


 


Acetaminophen/Codeine [Tylenol #3] 1 tab PO Q6H PRN #20 tab 02/22/16 Unknown Rx


 


Cyclobenzaprine [Flexeril] 10 mg PO TID PRN #20 tablet 02/22/16 Unknown Rx


 


Naproxen [Naprosyn TAB] 500 mg PO BID #30 tablet 02/22/16 Unknown Rx


 


Azithromycin [Zithromax Z-MODE] 250 mg PO QDAY #6 tablet 06/14/16 Unknown Rx


 


Ibuprofen [Motrin] 800 mg PO Q8HR PRN #10 tablet 06/14/16 Unknown Rx


 


Promethazine /Codeine 5 ml PO Q6H PRN #90 ml 06/14/16 Unknown Rx





[Phenergan/Codeine 6.25-10 mg/5Ml]    


 


Cyclobenzaprine HCl [Flexeril 5 MG 5 mg PO TID #20 tab 08/26/16 Unknown Rx





TAB]    


 


Ibuprofen [Motrin 800 MG tab] 800 mg PO Q8HR PRN #20 tablet 08/26/16 Unknown Rx


 


ALBUTEROL Inhaler (OR & NICU) 2 puff IH QID PRN #1 inhalation 11/25/17 Unknown 

Rx





[Proair]    


 


levoFLOXacin [Levaquin] 750 mg PO QDAY #7 tablet 11/25/17 Unknown Rx


 


Nitrofurantoin Monohyd/M-Cryst 100 mg PO Q12H 3 Days #6 capsule 12/10/17 Unknown

 Rx





[Macrobid 100 mg Capsule]    


 


Azithromycin [Zithromax Z-MODE] 250 mg PO DAILY #6 tablet 08/22/18 Unknown Rx


 


Benzonatate [Tessalon Perle] 100 mg PO Q8H PRN #20 capsule 08/22/18 Unknown Rx


 


Loratadine [Claritin] 10 mg PO DAILY #30 tablet 08/22/18 Unknown Rx


 


Ibuprofen [Motrin] 600 mg PO Q8H PRN #20 tablet 11/12/18 Unknown Rx


 


traMADol [Ultram 50 MG tab] 50 mg PO Q6HR PRN 3 Days #12 tablet 02/26/19 Unknown

Rx











                                    Allergies











Allergy/AdvReac Type Severity Reaction Status Date / Time


 


ampicillin Allergy  Itching Verified 08/18/15 20:17














ED Review of Systems


ROS: 


Stated complaint: TOOTHACHE


Other details as noted in HPI





Comment: All other systems reviewed and negative


Constitutional: denies: chills, fever


Eyes: denies: eye pain, eye discharge, vision change


ENT: other (tongue pain)





ED Past Medical Hx





- Past Medical History


Hx CVA: Yes (2010)


Hx Congestive Heart Failure: No


Hx Diabetes: No


Hx Arthritis: Yes


Hx Headaches / Migraines: Yes


Hx Psychiatric Treatment: Yes (depression)


Hx Asthma: No


Hx COPD: No


Hx HIV: No





- Surgical History


Hx Appendectomy: Yes


Additional Surgical History: hysterectomy, left ankle surgery, left carotid 

endarterectomy





- Social History


Smoking Status: Current Every Day Smoker


Substance Use Type: None





- Medications


Home Medications: 


                                Home Medications











 Medication  Instructions  Recorded  Confirmed  Last Taken  Type


 


Aspirin EC [Aspirin Enteric Coated 81 mg PO QDAY 06/09/15 11/18/15 Unknown 

History





TAB]     


 


Acetaminophen/Codeine [Tylenol #3] 1 tab PO Q6H PRN #20 tab 02/22/16  Unknown Rx


 


Cyclobenzaprine [Flexeril] 10 mg PO TID PRN #20 tablet 02/22/16  Unknown Rx


 


Naproxen [Naprosyn TAB] 500 mg PO BID #30 tablet 02/22/16  Unknown Rx


 


Azithromycin [Zithromax Z-MODE] 250 mg PO QDAY #6 tablet 06/14/16  Unknown Rx


 


Ibuprofen [Motrin] 800 mg PO Q8HR PRN #10 tablet 06/14/16  Unknown Rx


 


Promethazine /Codeine 5 ml PO Q6H PRN #90 ml 06/14/16  Unknown Rx





[Phenergan/Codeine 6.25-10 mg/5Ml]     


 


Cyclobenzaprine HCl [Flexeril 5 MG 5 mg PO TID #20 tab 08/26/16  Unknown Rx





TAB]     


 


Ibuprofen [Motrin 800 MG tab] 800 mg PO Q8HR PRN #20 tablet 08/26/16  Unknown Rx


 


ALBUTEROL Inhaler (OR & NICU) 2 puff IH QID PRN #1 inhalation 11/25/17  Unknown 

Rx





[Proair]     


 


levoFLOXacin [Levaquin] 750 mg PO QDAY #7 tablet 11/25/17  Unknown Rx


 


Nitrofurantoin Monohyd/M-Cryst 100 mg PO Q12H 3 Days #6 capsule 12/10/17  

Unknown Rx





[Macrobid 100 mg Capsule]     


 


Azithromycin [Zithromax Z-MODE] 250 mg PO DAILY #6 tablet 08/22/18  Unknown Rx


 


Benzonatate [Tessalon Perle] 100 mg PO Q8H PRN #20 capsule 08/22/18  Unknown Rx


 


Loratadine [Claritin] 10 mg PO DAILY #30 tablet 08/22/18  Unknown Rx


 


Ibuprofen [Motrin] 600 mg PO Q8H PRN #20 tablet 11/12/18  Unknown Rx


 


traMADol [Ultram 50 MG tab] 50 mg PO Q6HR PRN 3 Days #12 tablet 02/26/19  

Unknown Rx














ED Physical Exam





- General


Limitations: No Limitations


General appearance: alert, in no apparent distress





- Head


Head exam: Present: atraumatic, normocephalic





- Eye


Eye exam: Present: EOMI





- ENT


ENT exam: Present: mucous membranes moist





- Expanded ENT Exam


  ** Expanded


Teeth exam: Present: other (right lateral tongue near the buccal mucosa has an 

ulcer nonerythematous non-edematous does not appear to be infected.)





- Neck


Neck exam: Present: normal inspection, full ROM.  Absent: tenderness





- Neurological Exam


Neurological exam: Present: alert, oriented X3





- Psychiatric


Psychiatric exam: Present: normal affect, normal mood





- Skin


Skin exam: Present: warm, dry, intact, normal color.  Absent: rash





ED Course





                                   Vital Signs











  02/25/19





  20:23


 


Temperature 98.4 F


 


Pulse Rate 95 H


 


Respiratory 18





Rate 


 


Blood Pressure 104/71


 


O2 Sat by Pulse 97





Oximetry 














ED Medical Decision Making





- Medical Decision Making





Patient has been evaluated by this provider in fast track.


We'll discharge patient home on tramadol since she drove.


Patient is to follow-up with the dentist continue using the benzocaine in oral 

chill mouthwash as needed she can take over-the-counter Tylenol or Motrin with 

tramadol.  Patient is to follow-up with a dentist in the next 2-3 days.


Critical care attestation.: 


If time is entered above; I have spent that time in minutes in the direct care 

of this critically ill patient, excluding procedure time.








ED Disposition


Clinical Impression: 


 Traumatic ulceration of tongue





Disposition: DC-01 TO HOME OR SELFCARE


Is pt being admited?: No


Does the pt Need Aspirin: No


Condition: Stable


Additional Instructions: 


Please take pain medication as needed.  Continue taking over-the-counter 

anesthetics.  Recommended to follow up with a dentist I will refer her to 

several below in the community.


Prescriptions: 


traMADol [Ultram 50 MG tab] 50 mg PO Q6HR PRN 3 Days #12 tablet


 PRN Reason: Pain


Referrals: 


[facility] [Primary Care Provider] - 3-5 Days


[facility] [Outside] - 3-5 Days


[facility] [Outside] - 3-5 Days


Forms:  Work/School Release Form(ED)

## 2019-04-09 ENCOUNTER — HOSPITAL ENCOUNTER (EMERGENCY)
Dept: HOSPITAL 5 - ED | Age: 59
Discharge: HOME | End: 2019-04-09
Payer: SELF-PAY

## 2019-04-09 VITALS — DIASTOLIC BLOOD PRESSURE: 81 MMHG | SYSTOLIC BLOOD PRESSURE: 108 MMHG

## 2019-04-09 DIAGNOSIS — Z90.710: ICD-10-CM

## 2019-04-09 DIAGNOSIS — F32.9: ICD-10-CM

## 2019-04-09 DIAGNOSIS — M19.90: ICD-10-CM

## 2019-04-09 DIAGNOSIS — Z88.1: ICD-10-CM

## 2019-04-09 DIAGNOSIS — G40.909: ICD-10-CM

## 2019-04-09 DIAGNOSIS — H10.13: Primary | ICD-10-CM

## 2019-04-09 DIAGNOSIS — F17.200: ICD-10-CM

## 2019-04-09 PROCEDURE — 99282 EMERGENCY DEPT VISIT SF MDM: CPT

## 2019-04-09 NOTE — EMERGENCY DEPARTMENT REPORT
ED General Adult HPI





- General


Chief complaint: Eye Problems


Stated complaint: LEFT EYE PAIN/ITCHY/REDNESS/BLURRY


Time Seen by Provider: 04/09/19 12:31


Source: patient


Mode of arrival: Ambulatory


Limitations: No Limitations





- History of Present Illness


Initial comments: 





Patient presents to the emergency department with chief complaint of eye 

irritation from pollen.  Patient complains of eyes itching really bad as well as

being red.  Patient has not taken any over-the-counter medication for her 

symptoms.  Patient worsens or she rise in a car with windows down because she 

does not have her condition


-: Gradual


Location: eyes


Severity scale (0 -10): 1


Quality: burning


Consistency: constant


Improves with: none


Worsens with: none


Associated Symptoms: denies other symptoms


Treatments Prior to Arrival: none





- Related Data


                                Home Medications











 Medication  Instructions  Recorded  Confirmed  Last Taken


 


Aspirin EC [Aspirin Enteric Coated 81 mg PO QDAY 06/09/15 11/18/15 Unknown





TAB]    








                                  Previous Rx's











 Medication  Instructions  Recorded  Last Taken  Type


 


Acetaminophen/Codeine [Tylenol #3] 1 tab PO Q6H PRN #20 tab 02/22/16 Unknown Rx


 


Cyclobenzaprine [Flexeril] 10 mg PO TID PRN #20 tablet 02/22/16 Unknown Rx


 


Naproxen [Naprosyn TAB] 500 mg PO BID #30 tablet 02/22/16 Unknown Rx


 


Azithromycin [Zithromax Z-MODE] 250 mg PO QDAY #6 tablet 06/14/16 Unknown Rx


 


Ibuprofen [Motrin] 800 mg PO Q8HR PRN #10 tablet 06/14/16 Unknown Rx


 


Promethazine /Codeine 5 ml PO Q6H PRN #90 ml 06/14/16 Unknown Rx





[Phenergan/Codeine 6.25-10 mg/5Ml]    


 


Cyclobenzaprine HCl [Flexeril 5 MG 5 mg PO TID #20 tab 08/26/16 Unknown Rx





TAB]    


 


Ibuprofen [Motrin 800 MG tab] 800 mg PO Q8HR PRN #20 tablet 08/26/16 Unknown Rx


 


ALBUTEROL Inhaler (OR & NICU) 2 puff IH QID PRN #1 inhalation 11/25/17 Unknown 

Rx





[Proair]    


 


levoFLOXacin [Levaquin] 750 mg PO QDAY #7 tablet 11/25/17 Unknown Rx


 


Nitrofurantoin Monohyd/M-Cryst 100 mg PO Q12H 3 Days #6 capsule 12/10/17 Unknown

 Rx





[Macrobid 100 mg Capsule]    


 


Azithromycin [Zithromax Z-MODE] 250 mg PO DAILY #6 tablet 08/22/18 Unknown Rx


 


Benzonatate [Tessalon Perle] 100 mg PO Q8H PRN #20 capsule 08/22/18 Unknown Rx


 


Loratadine [Claritin] 10 mg PO DAILY #30 tablet 08/22/18 Unknown Rx


 


Ibuprofen [Motrin] 600 mg PO Q8H PRN #20 tablet 11/12/18 Unknown Rx


 


traMADol [Ultram 50 MG tab] 50 mg PO Q6HR PRN 3 Days #12 tablet 02/26/19 Unknown

Rx


 


Bepotastine Besilate [Bepreve 1.5%] 1 drop OU BID #1 drops 04/09/19 Unknown Rx


 


Cetirizine HCl [ZyrTEC] 10 mg PO DAILY #30 tab.rapdis 04/09/19 Unknown Rx


 


Fluticasone [Flonase] 1 spray NS QDAY #1 bottle 04/09/19 Unknown Rx











                                    Allergies











Allergy/AdvReac Type Severity Reaction Status Date / Time


 


ampicillin Allergy  Itching Verified 08/18/15 20:17














ED Review of Systems


ROS: 


Stated complaint: LEFT EYE PAIN/ITCHY/REDNESS/BLURRY


Other details as noted in HPI





Comment: All other systems reviewed and negative


Constitutional: denies: chills, fever


Eyes: denies: eye pain, eye discharge, vision change


ENT: denies: ear pain, throat pain


Respiratory: denies: cough, shortness of breath, wheezing


Cardiovascular: denies: chest pain, palpitations


Endocrine: no symptoms reported


Gastrointestinal: denies: abdominal pain, nausea, diarrhea


Genitourinary: denies: urgency, dysuria, discharge


Musculoskeletal: denies: back pain, joint swelling, arthralgia


Skin: denies: rash, lesions


Neurological: denies: headache, weakness, paresthesias


Psychiatric: denies: anxiety, depression


Hematological/Lymphatic: denies: easy bleeding, easy bruising





ED Past Medical Hx





- Past Medical History


Hx CVA: Yes (2010)


Hx Congestive Heart Failure: No


Hx Diabetes: No


Hx Arthritis: Yes


Hx Headaches / Migraines: Yes


Hx Psychiatric Treatment: Yes (depression)


Hx Asthma: No


Hx COPD: No


Hx HIV: No





- Surgical History


Past Surgical History?: Yes


Hx Appendectomy: Yes


Additional Surgical History: hysterectomy, left ankle surgery, left carotid 

endarterectomy





- Social History


Smoking Status: Current Every Day Smoker





- Medications


Home Medications: 


                                Home Medications











 Medication  Instructions  Recorded  Confirmed  Last Taken  Type


 


Aspirin EC [Aspirin Enteric Coated 81 mg PO QDAY 06/09/15 11/18/15 Unknown 

History





TAB]     


 


Acetaminophen/Codeine [Tylenol #3] 1 tab PO Q6H PRN #20 tab 02/22/16  Unknown Rx


 


Cyclobenzaprine [Flexeril] 10 mg PO TID PRN #20 tablet 02/22/16  Unknown Rx


 


Naproxen [Naprosyn TAB] 500 mg PO BID #30 tablet 02/22/16  Unknown Rx


 


Azithromycin [Zithromax Z-MODE] 250 mg PO QDAY #6 tablet 06/14/16  Unknown Rx


 


Ibuprofen [Motrin] 800 mg PO Q8HR PRN #10 tablet 06/14/16  Unknown Rx


 


Promethazine /Codeine 5 ml PO Q6H PRN #90 ml 06/14/16  Unknown Rx





[Phenergan/Codeine 6.25-10 mg/5Ml]     


 


Cyclobenzaprine HCl [Flexeril 5 MG 5 mg PO TID #20 tab 08/26/16  Unknown Rx





TAB]     


 


Ibuprofen [Motrin 800 MG tab] 800 mg PO Q8HR PRN #20 tablet 08/26/16  Unknown Rx


 


ALBUTEROL Inhaler (OR & NICU) 2 puff IH QID PRN #1 inhalation 11/25/17  Unknown 

Rx





[Proair]     


 


levoFLOXacin [Levaquin] 750 mg PO QDAY #7 tablet 11/25/17  Unknown Rx


 


Nitrofurantoin Monohyd/M-Cryst 100 mg PO Q12H 3 Days #6 capsule 12/10/17  

Unknown Rx





[Macrobid 100 mg Capsule]     


 


Azithromycin [Zithromax Z-MODE] 250 mg PO DAILY #6 tablet 08/22/18  Unknown Rx


 


Benzonatate [Tessalon Perle] 100 mg PO Q8H PRN #20 capsule 08/22/18  Unknown Rx


 


Loratadine [Claritin] 10 mg PO DAILY #30 tablet 08/22/18  Unknown Rx


 


Ibuprofen [Motrin] 600 mg PO Q8H PRN #20 tablet 11/12/18  Unknown Rx


 


traMADol [Ultram 50 MG tab] 50 mg PO Q6HR PRN 3 Days #12 tablet 02/26/19  

Unknown Rx


 


Bepotastine Besilate [Bepreve 1.5%] 1 drop OU BID #1 drops 04/09/19  Unknown Rx


 


Cetirizine HCl [ZyrTEC] 10 mg PO DAILY #30 tab.rapdis 04/09/19  Unknown Rx


 


Fluticasone [Flonase] 1 spray NS QDAY #1 bottle 04/09/19  Unknown Rx














ED Physical Exam





- General


Limitations: No Limitations


General appearance: alert, in no apparent distress





- Head


Head exam: Present: atraumatic, normocephalic





- Eye


Eye exam: Present: normal appearance, PERRL, EOMI, other (cobblestoning of the 

palpebral conjunctiva)





- ENT


ENT exam: Present: mucous membranes moist





- Neck


Neck exam: Present: normal inspection





- Respiratory


Respiratory exam: Present: normal lung sounds bilaterally.  Absent: respiratory 

distress





- Extremities Exam


Extremities exam: Present: normal inspection





- Back Exam


Back exam: Present: normal inspection





- Neurological Exam


Neurological exam: Present: alert, oriented X3, CN II-XII intact.  Absent: motor

 sensory deficit





- Psychiatric


Psychiatric exam: Present: normal affect, normal mood





- Skin


Skin exam: Present: warm, dry, intact, normal color.  Absent: rash





ED Course





                                   Vital Signs











  04/09/19





  11:28


 


Temperature 97.9 F


 


Pulse Rate 82


 


Respiratory 20





Rate 


 


Blood Pressure 108/81


 


O2 Sat by Pulse 99





Oximetry 














ED Medical Decision Making





- Medical Decision Making





Discussed plan of care with patient


Critical care attestation.: 


If time is entered above; I have spent that time in minutes in the direct care 

of this critically ill patient, excluding procedure time.








ED Disposition


Clinical Impression: 


 Allergic conjunctivitis





Disposition: DC-01 TO HOME OR SELFCARE


Is pt being admited?: No


Does the pt Need Aspirin: No


Condition: Stable


Instructions:  Allergies (ED)


Additional Instructions: 


return if worse


Prescriptions: 


Bepotastine Besilate [Bepreve 1.5%] 1 drop OU BID #1 drops


Fluticasone [Flonase] 1 spray NS QDAY #1 bottle


Cetirizine HCl [ZyrTEC] 10 mg PO DAILY #30 tab.rapdis


Referrals: 


[facility] [Provider Group] - 3-5 Days


[facility] [Provider Group] - 3-5 Days


Time of Disposition: 13:01

## 2019-06-16 ENCOUNTER — HOSPITAL ENCOUNTER (EMERGENCY)
Dept: HOSPITAL 5 - ED | Age: 59
LOS: 1 days | Discharge: HOME | End: 2019-06-17
Payer: SELF-PAY

## 2019-06-16 DIAGNOSIS — Z79.899: ICD-10-CM

## 2019-06-16 DIAGNOSIS — Z90.49: ICD-10-CM

## 2019-06-16 DIAGNOSIS — F32.9: ICD-10-CM

## 2019-06-16 DIAGNOSIS — F17.200: ICD-10-CM
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
## 2019-06-16 DIAGNOSIS — S43.421A: Primary | ICD-10-CM
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
## 2019-06-16 DIAGNOSIS — Z90.710: ICD-10-CM

## 2019-06-16 DIAGNOSIS — Z88.1: ICD-10-CM

## 2019-06-16 DIAGNOSIS — M19.90: ICD-10-CM
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
## 2019-06-16 DIAGNOSIS — G43.909: ICD-10-CM

## 2019-06-16 PROCEDURE — 99282 EMERGENCY DEPT VISIT SF MDM: CPT

## 2019-06-16 PROCEDURE — 96372 THER/PROPH/DIAG INJ SC/IM: CPT

## 2019-06-17 VITALS — DIASTOLIC BLOOD PRESSURE: 71 MMHG | SYSTOLIC BLOOD PRESSURE: 112 MMHG

## 2019-06-17 NOTE — EMERGENCY DEPARTMENT REPORT
ED Upper Extremity Inj HPI





- General


Chief Complaint: Shoulder Injury


Stated Complaint: RT SHOULDER/ARM PAIN


Time Seen by Provider: 06/17/19 01:39


Source: patient


Mode of arrival: Ambulatory


Limitations: No Limitations





- History of Present Illness


Initial Comments: 





59-year-old female presents to ED with complaint of right shoulder pain.  

Patient works as a , states she had to lift a heavy watermelon and a 24-

pack of water 2 days ago at work.  Patient then began having pain in the right 

shoulder, unable to lift her shoulder due to the pain.  Tried topical analgesics

without relief.


MD Complaint: Injury to:: right, shoulder


-: days(s) (2)


Other Injuries: none


Place: work


Improves With: immobilization


Worsens With: movement of extremity


Context: other (heavy lifting)


Associated Symptoms: denies: numbness





- Related Data


                                Home Medications











 Medication  Instructions  Recorded  Confirmed  Last Taken


 


Aspirin EC 81 mg PO QDAY 06/09/15 11/18/15 Unknown








                                  Previous Rx's











 Medication  Instructions  Recorded  Last Taken  Type


 


Acetaminophen/Codeine [Tylenol #3] 1 tab PO Q6H PRN #20 tab 02/22/16 Unknown Rx


 


Cyclobenzaprine [Flexeril] 10 mg PO TID PRN #20 tablet 02/22/16 Unknown Rx


 


Naproxen [Naprosyn TAB] 500 mg PO BID #30 tablet 02/22/16 Unknown Rx


 


Azithromycin [Zithromax Z-MODE] 250 mg PO QDAY #6 tablet 06/14/16 Unknown Rx


 


Ibuprofen [Motrin] 800 mg PO Q8HR PRN #10 tablet 06/14/16 Unknown Rx


 


Promethazine /Codeine 5 ml PO Q6H PRN #90 ml 06/14/16 Unknown Rx





[Phenergan/Codeine 6.25-10 mg/5Ml]    


 


Cyclobenzaprine HCl [Flexeril 5 MG 5 mg PO TID #20 tab 08/26/16 Unknown Rx





TAB]    


 


Ibuprofen [Motrin 800 MG tab] 800 mg PO Q8HR PRN #20 tablet 08/26/16 Unknown Rx


 


ALBUTEROL Inhaler (OR & NICU) 2 puff IH QID PRN #1 inhalation 11/25/17 Unknown 

Rx





[Proair]    


 


levoFLOXacin [Levaquin] 750 mg PO QDAY #7 tablet 11/25/17 Unknown Rx


 


Nitrofurantoin Monohyd/M-Cryst 100 mg PO Q12H 3 Days #6 capsule 12/10/17 Unknown

 Rx





[Macrobid 100 mg Capsule]    


 


Azithromycin [Zithromax Z-MODE] 250 mg PO DAILY #6 tablet 08/22/18 Unknown Rx


 


Benzonatate [Tessalon Perle] 100 mg PO Q8H PRN #20 capsule 08/22/18 Unknown Rx


 


Loratadine [Claritin] 10 mg PO DAILY #30 tablet 08/22/18 Unknown Rx


 


Ibuprofen [Motrin] 600 mg PO Q8H PRN #20 tablet 11/12/18 Unknown Rx


 


traMADol [Ultram 50 MG tab] 50 mg PO Q6HR PRN 3 Days #12 tablet 02/26/19 Unknown

Rx


 


Bepotastine Besilate [Bepreve 1.5%] 1 drop OU BID #1 drops 04/09/19 Unknown Rx


 


Cetirizine HCl [ZyrTEC] 10 mg PO DAILY #30 tab.rapdis 04/09/19 Unknown Rx


 


Fluticasone [Flonase] 1 spray NS QDAY #1 bottle 04/09/19 Unknown Rx


 


Naproxen [Naprosyn] 500 mg PO BID #20 tablet 06/17/19 Unknown Rx


 


methOCARBAMOL [Robaxin TAB] 500 mg PO Q8HR PRN #20 tablet 06/17/19 Unknown Rx


 


predniSONE [Deltasone] 50 mg PO QDAY #5 tab 06/17/19 Unknown Rx


 


traMADol [Ultram] 50 mg PO Q6HR PRN #7 tablet 06/17/19 Unknown Rx











                                    Allergies











Allergy/AdvReac Type Severity Reaction Status Date / Time


 


ampicillin Allergy  Itching Verified 08/18/15 20:17














ED Review of Systems


ROS: 


Stated complaint: RT SHOULDER/ARM PAIN


Other details as noted in HPI





Comment: All other systems reviewed and negative


Musculoskeletal: as per HPI


Neurological: denies: numbness, paresthesias





ED Past Medical Hx





- Past Medical History


Hx CVA: Yes (2010- NO RESIDUAL)


Hx Congestive Heart Failure: No


Hx Diabetes: No


Hx Arthritis: Yes


Hx Headaches / Migraines: Yes


Hx Psychiatric Treatment: Yes (depression)


Hx Asthma: No


Hx COPD: No


Hx HIV: No





- Surgical History


Hx Appendectomy: Yes


Additional Surgical History: hysterectomy, left ankle surgery, left carotid 

endarterectomy





- Social History


Smoking Status: Current Every Day Smoker


Substance Use Type: None





- Medications


Home Medications: 


                                Home Medications











 Medication  Instructions  Recorded  Confirmed  Last Taken  Type


 


Aspirin EC 81 mg PO QDAY 06/09/15 11/18/15 Unknown History


 


Acetaminophen/Codeine [Tylenol #3] 1 tab PO Q6H PRN #20 tab 02/22/16  Unknown Rx


 


Cyclobenzaprine [Flexeril] 10 mg PO TID PRN #20 tablet 02/22/16  Unknown Rx


 


Naproxen [Naprosyn TAB] 500 mg PO BID #30 tablet 02/22/16  Unknown Rx


 


Azithromycin [Zithromax Z-MODE] 250 mg PO QDAY #6 tablet 06/14/16  Unknown Rx


 


Ibuprofen [Motrin] 800 mg PO Q8HR PRN #10 tablet 06/14/16  Unknown Rx


 


Promethazine /Codeine 5 ml PO Q6H PRN #90 ml 06/14/16  Unknown Rx





[Phenergan/Codeine 6.25-10 mg/5Ml]     


 


Cyclobenzaprine HCl [Flexeril 5 MG 5 mg PO TID #20 tab 08/26/16  Unknown Rx





TAB]     


 


Ibuprofen [Motrin 800 MG tab] 800 mg PO Q8HR PRN #20 tablet 08/26/16  Unknown Rx


 


ALBUTEROL Inhaler (OR & NICU) 2 puff IH QID PRN #1 inhalation 11/25/17  Unknown 

Rx





[Proair]     


 


levoFLOXacin [Levaquin] 750 mg PO QDAY #7 tablet 11/25/17  Unknown Rx


 


Nitrofurantoin Monohyd/M-Cryst 100 mg PO Q12H 3 Days #6 capsule 12/10/17  

Unknown Rx





[Macrobid 100 mg Capsule]     


 


Azithromycin [Zithromax Z-MODE] 250 mg PO DAILY #6 tablet 08/22/18  Unknown Rx


 


Benzonatate [Tessalon Perle] 100 mg PO Q8H PRN #20 capsule 08/22/18  Unknown Rx


 


Loratadine [Claritin] 10 mg PO DAILY #30 tablet 08/22/18  Unknown Rx


 


Ibuprofen [Motrin] 600 mg PO Q8H PRN #20 tablet 11/12/18  Unknown Rx


 


traMADol [Ultram 50 MG tab] 50 mg PO Q6HR PRN 3 Days #12 tablet 02/26/19  

Unknown Rx


 


Bepotastine Besilate [Bepreve 1.5%] 1 drop OU BID #1 drops 04/09/19  Unknown Rx


 


Cetirizine HCl [ZyrTEC] 10 mg PO DAILY #30 tab.rapdis 04/09/19  Unknown Rx


 


Fluticasone [Flonase] 1 spray NS QDAY #1 bottle 04/09/19  Unknown Rx


 


Naproxen [Naprosyn] 500 mg PO BID #20 tablet 06/17/19  Unknown Rx


 


methOCARBAMOL [Robaxin TAB] 500 mg PO Q8HR PRN #20 tablet 06/17/19  Unknown Rx


 


predniSONE [Deltasone] 50 mg PO QDAY #5 tab 06/17/19  Unknown Rx


 


traMADol [Ultram] 50 mg PO Q6HR PRN #7 tablet 06/17/19  Unknown Rx














ED Physical Exam





- General


Limitations: No Limitations


General appearance: alert, in no apparent distress, other (frail)





- Head


Head exam: Present: atraumatic, normocephalic





- Eye


Eye exam: Present: normal appearance





- ENT


ENT exam: Present: mucous membranes moist





- Neck


Neck exam: Present: normal inspection





- Respiratory


Respiratory exam: Present: normal lung sounds bilaterally.  Absent: respiratory 

distress





- Cardiovascular


Cardiovascular Exam: Present: regular rate, normal rhythm





- GI/Abdominal


GI/Abdominal exam: Absent: distended





- Extremities Exam


Extremities exam: Present: other (tenderness to right posterior shoulder along 

border of scapula; pain with abduction; limited ROM with abduction; strength 

5/5; sensation intact; no deformity or swelling)





- Neurological Exam


Neurological exam: Present: alert, oriented X3.  Absent: motor sensory deficit





- Psychiatric


Psychiatric exam: Present: normal affect, normal mood





- Skin


Skin exam: Present: warm, dry, intact, normal color.  Absent: rash





ED Course


                                   Vital Signs











  06/16/19 06/16/19 06/17/19





  20:25 21:35 01:50


 


Temperature 97.5 F L 97.5 F L 


 


Pulse Rate 95 H 91 H 90


 


Respiratory 18 18 15





Rate   


 


Blood Pressure 121/80 121/80 


 


O2 Sat by Pulse 100 100 100





Oximetry   














  06/17/19 06/17/19





  02:00 02:15


 


Temperature  


 


Pulse Rate 79 75


 


Respiratory 15 17





Rate  


 


Blood Pressure 99/57 112/71


 


O2 Sat by Pulse 98 98





Oximetry  














ED Medical Decision Making





- Medical Decision Making





59-year-old female with right shoulder pain after lifting heavy items at work.  

Likely rotator cuff tendinitis.  No neuro deficits present.  Patient given 

ibuprofen, Solu-Medrol and Robaxin here in the ED.  Will discharge with 

prescription for prednisone, Robaxin, Naprosyn, and Ultram.  Patient advised to 

follow-up with orthopedist.  Return precautions given.





- Differential Diagnosis


shoulder sprain


Critical care attestation.: 


If time is entered above; I have spent that time in minutes in the direct care 

of this critically ill patient, excluding procedure time.








ED Disposition


Clinical Impression: 


 Rotator cuff (capsule) sprain





Disposition: DC-01 TO HOME OR SELFCARE


Is pt being admited?: No


Condition: Stable


Instructions:  Rotator Cuff Tendinitis (ED)


Prescriptions: 


predniSONE [Deltasone] 50 mg PO QDAY #5 tab


Naproxen [Naprosyn] 500 mg PO BID #20 tablet


methOCARBAMOL [Robaxin TAB] 500 mg PO Q8HR PRN #20 tablet


 PRN Reason: Muscle Spasm


traMADol [Ultram] 50 mg PO Q6HR PRN #7 tablet


 PRN Reason: Pain


Referrals: 


[facility] [Primary Care Provider] - 3-5 Days


ANUJ ALEJO MD [Staff Physician] - 3-5 Days


Forms:  Work/School Release Form(ED)


Time of Disposition: 02:29

## 2019-10-27 ENCOUNTER — HOSPITAL ENCOUNTER (EMERGENCY)
Dept: HOSPITAL 5 - ED | Age: 59
Discharge: HOME | End: 2019-10-27
Payer: SELF-PAY

## 2019-10-27 VITALS — SYSTOLIC BLOOD PRESSURE: 108 MMHG | DIASTOLIC BLOOD PRESSURE: 72 MMHG

## 2019-10-27 DIAGNOSIS — M19.90: ICD-10-CM

## 2019-10-27 DIAGNOSIS — F32.9: ICD-10-CM

## 2019-10-27 DIAGNOSIS — Z90.710: ICD-10-CM

## 2019-10-27 DIAGNOSIS — Z88.1: ICD-10-CM

## 2019-10-27 DIAGNOSIS — L03.211: Primary | ICD-10-CM

## 2019-10-27 DIAGNOSIS — F17.200: ICD-10-CM

## 2019-10-27 DIAGNOSIS — Z90.89: ICD-10-CM

## 2019-10-27 DIAGNOSIS — Z86.73: ICD-10-CM

## 2019-10-27 NOTE — EMERGENCY DEPARTMENT REPORT
- General


Chief complaint: Allergic Reaction


Stated complaint: ALLERGIC REACTION


Time Seen by Provider: 10/27/19 19:39


Source: patient


Mode of arrival: Ambulatory


Limitations: No Limitations





- History of Present Illness


Initial comments: 





pt is a 60 yo female who presents to the ED with c/o skin rash to the chin that 

began a week ago. states she used an electronic skin hair removal on the chin 

two weeks ago. states initially she had tingling then broke out in a rash and 

scabbing. states she has associated itching/burning. she denies any fever, 

chills, n/v. pmhx CVA, migraines, depression, osteoporosis. allergy: ampicillin.

can take other penicillins with no issue. 





- Related Data


                                Home Medications











 Medication  Instructions  Recorded  Confirmed  Last Taken


 


Aspirin EC [Halfprin EC] 81 mg PO QDAY 06/09/15 11/18/15 Unknown








                                  Previous Rx's











 Medication  Instructions  Recorded  Last Taken  Type


 


Acetaminophen/Codeine [Tylenol #3] 1 tab PO Q6H PRN #20 tab 02/22/16 Unknown Rx


 


Cyclobenzaprine [Flexeril] 10 mg PO TID PRN #20 tablet 02/22/16 Unknown Rx


 


Naproxen [Naprosyn TAB] 500 mg PO BID #30 tablet 02/22/16 Unknown Rx


 


Azithromycin [Zithromax Z-MODE] 250 mg PO QDAY #6 tablet 06/14/16 Unknown Rx


 


Ibuprofen [Motrin] 800 mg PO Q8HR PRN #10 tablet 06/14/16 Unknown Rx


 


Promethazine /Codeine 5 ml PO Q6H PRN #90 ml 06/14/16 Unknown Rx





[Phenergan/Codeine 6.25-10 mg/5Ml]    


 


Cyclobenzaprine HCl [Flexeril 5 MG 5 mg PO TID #20 tab 08/26/16 Unknown Rx





TAB]    


 


Ibuprofen [Motrin 800 MG tab] 800 mg PO Q8HR PRN #20 tablet 08/26/16 Unknown Rx


 


ALBUTEROL Inhaler (OR & NICU) 2 puff IH QID PRN #1 inhalation 11/25/17 Unknown 

Rx





[Proair]    


 


levoFLOXacin [Levaquin] 750 mg PO QDAY #7 tablet 11/25/17 Unknown Rx


 


Nitrofurantoin Monohyd/M-Cryst 100 mg PO Q12H 3 Days #6 capsule 12/10/17 Unknown

 Rx





[Macrobid 100 mg Capsule]    


 


Azithromycin [Zithromax Z-MODE] 250 mg PO DAILY #6 tablet 08/22/18 Unknown Rx


 


Benzonatate [Tessalon Perle] 100 mg PO Q8H PRN #20 capsule 08/22/18 Unknown Rx


 


Loratadine [Claritin] 10 mg PO DAILY #30 tablet 08/22/18 Unknown Rx


 


Ibuprofen [Motrin] 600 mg PO Q8H PRN #20 tablet 11/12/18 Unknown Rx


 


traMADol [Ultram 50 MG tab] 50 mg PO Q6HR PRN 3 Days #12 tablet 02/26/19 Unknown

Rx


 


Bepotastine Besilate [Bepreve 1.5%] 1 drop OU BID #1 drops 04/09/19 Unknown Rx


 


Cetirizine HCl [ZyrTEC] 10 mg PO DAILY #30 tab.rapdis 04/09/19 Unknown Rx


 


Fluticasone [Flonase] 1 spray NS QDAY #1 bottle 04/09/19 Unknown Rx


 


Naproxen [Naprosyn] 500 mg PO BID #20 tablet 06/17/19 Unknown Rx


 


methOCARBAMOL [Robaxin TAB] 500 mg PO Q8HR PRN #20 tablet 06/17/19 Unknown Rx


 


predniSONE [Deltasone] 50 mg PO QDAY #5 tab 06/17/19 Unknown Rx


 


traMADol [Ultram] 50 mg PO Q6HR PRN #7 tablet 06/17/19 Unknown Rx


 


Ketorolac [Toradol] 10 mg PO Q6H PRN #15 tablet 09/22/19 Unknown Rx


 


methOCARBAMOL [Robaxin] 750 mg PO Q8H PRN #21 tablet 09/22/19 Unknown Rx


 


predniSONE [Deltasone] 50 mg PO QDAY #5 tab 09/22/19 Unknown Rx


 


Doxycycline Hyclate [Doxycycline 100 mg PO BID 7 Days #14 tab 10/27/19 Unknown 

Rx





Hyclate TAB]    


 


Mupirocin [Bactroban 2% OINT] 1 applic TP TID #1 tube 10/27/19 Unknown Rx











                                    Allergies











Allergy/AdvReac Type Severity Reaction Status Date / Time


 


ampicillin Allergy  Itching Verified 08/18/15 20:17














Abscess Boil HPI





- HPI


Chief Complaint: Allergic Reaction


Stated Complaint: ALLERGIC REACTION


Time Seen by Provider: 10/27/19 19:39


Home Medications: 


                                Home Medications











 Medication  Instructions  Recorded  Confirmed  Last Taken


 


Aspirin EC [Halfprin EC] 81 mg PO QDAY 06/09/15 11/18/15 Unknown








                                  Previous Rx's











 Medication  Instructions  Recorded  Last Taken  Type


 


Acetaminophen/Codeine [Tylenol #3] 1 tab PO Q6H PRN #20 tab 02/22/16 Unknown Rx


 


Cyclobenzaprine [Flexeril] 10 mg PO TID PRN #20 tablet 02/22/16 Unknown Rx


 


Naproxen [Naprosyn TAB] 500 mg PO BID #30 tablet 02/22/16 Unknown Rx


 


Azithromycin [Zithromax Z-MODE] 250 mg PO QDAY #6 tablet 06/14/16 Unknown Rx


 


Ibuprofen [Motrin] 800 mg PO Q8HR PRN #10 tablet 06/14/16 Unknown Rx


 


Promethazine /Codeine 5 ml PO Q6H PRN #90 ml 06/14/16 Unknown Rx





[Phenergan/Codeine 6.25-10 mg/5Ml]    


 


Cyclobenzaprine HCl [Flexeril 5 MG 5 mg PO TID #20 tab 08/26/16 Unknown Rx





TAB]    


 


Ibuprofen [Motrin 800 MG tab] 800 mg PO Q8HR PRN #20 tablet 08/26/16 Unknown Rx


 


ALBUTEROL Inhaler (OR & NICU) 2 puff IH QID PRN #1 inhalation 11/25/17 Unknown 

Rx





[Proair]    


 


levoFLOXacin [Levaquin] 750 mg PO QDAY #7 tablet 11/25/17 Unknown Rx


 


Nitrofurantoin Monohyd/M-Cryst 100 mg PO Q12H 3 Days #6 capsule 12/10/17 Unknown

 Rx





[Macrobid 100 mg Capsule]    


 


Azithromycin [Zithromax Z-MODE] 250 mg PO DAILY #6 tablet 08/22/18 Unknown Rx


 


Benzonatate [Tessalon Perle] 100 mg PO Q8H PRN #20 capsule 08/22/18 Unknown Rx


 


Loratadine [Claritin] 10 mg PO DAILY #30 tablet 08/22/18 Unknown Rx


 


Ibuprofen [Motrin] 600 mg PO Q8H PRN #20 tablet 11/12/18 Unknown Rx


 


traMADol [Ultram 50 MG tab] 50 mg PO Q6HR PRN 3 Days #12 tablet 02/26/19 Unknown

Rx


 


Bepotastine Besilate [Bepreve 1.5%] 1 drop OU BID #1 drops 04/09/19 Unknown Rx


 


Cetirizine HCl [ZyrTEC] 10 mg PO DAILY #30 tab.rapdis 04/09/19 Unknown Rx


 


Fluticasone [Flonase] 1 spray NS QDAY #1 bottle 04/09/19 Unknown Rx


 


Naproxen [Naprosyn] 500 mg PO BID #20 tablet 06/17/19 Unknown Rx


 


methOCARBAMOL [Robaxin TAB] 500 mg PO Q8HR PRN #20 tablet 06/17/19 Unknown Rx


 


predniSONE [Deltasone] 50 mg PO QDAY #5 tab 06/17/19 Unknown Rx


 


traMADol [Ultram] 50 mg PO Q6HR PRN #7 tablet 06/17/19 Unknown Rx


 


Ketorolac [Toradol] 10 mg PO Q6H PRN #15 tablet 09/22/19 Unknown Rx


 


methOCARBAMOL [Robaxin] 750 mg PO Q8H PRN #21 tablet 09/22/19 Unknown Rx


 


predniSONE [Deltasone] 50 mg PO QDAY #5 tab 09/22/19 Unknown Rx


 


Doxycycline Hyclate [Doxycycline 100 mg PO BID 7 Days #14 tab 10/27/19 Unknown 

Rx





Hyclate TAB]    


 


Mupirocin [Bactroban 2% OINT] 1 applic TP TID #1 tube 10/27/19 Unknown Rx











Allergies/Adverse Reactions: 


                                    Allergies











Allergy/AdvReac Type Severity Reaction Status Date / Time


 


ampicillin Allergy  Itching Verified 08/18/15 20:17














ED Review of Systems


ROS: 


Stated complaint: ALLERGIC REACTION


Other details as noted in HPI





Comment: All other systems reviewed and negative





ED Past Medical Hx





- Past Medical History


Hx CVA: Yes (2010- NO RESIDUAL)


Hx Congestive Heart Failure: No


Hx Diabetes: No


Hx Arthritis: Yes


Hx Headaches / Migraines: Yes


Hx Psychiatric Treatment: Yes (depression)


Hx Asthma: No


Hx COPD: No


Hx HIV: No





- Surgical History


Hx Appendectomy: Yes


Additional Surgical History: hysterectomy, left ankle surgery, left carotid 

endarterectomy





- Social History


Smoking Status: Current Every Day Smoker


Substance Use Type: None





- Medications


Home Medications: 


                                Home Medications











 Medication  Instructions  Recorded  Confirmed  Last Taken  Type


 


Aspirin EC [Halfprin EC] 81 mg PO QDAY 06/09/15 11/18/15 Unknown History


 


Acetaminophen/Codeine [Tylenol #3] 1 tab PO Q6H PRN #20 tab 02/22/16  Unknown Rx


 


Cyclobenzaprine [Flexeril] 10 mg PO TID PRN #20 tablet 02/22/16  Unknown Rx


 


Naproxen [Naprosyn TAB] 500 mg PO BID #30 tablet 02/22/16  Unknown Rx


 


Azithromycin [Zithromax Z-MODE] 250 mg PO QDAY #6 tablet 06/14/16  Unknown Rx


 


Ibuprofen [Motrin] 800 mg PO Q8HR PRN #10 tablet 06/14/16  Unknown Rx


 


Promethazine /Codeine 5 ml PO Q6H PRN #90 ml 06/14/16  Unknown Rx





[Phenergan/Codeine 6.25-10 mg/5Ml]     


 


Cyclobenzaprine HCl [Flexeril 5 MG 5 mg PO TID #20 tab 08/26/16  Unknown Rx





TAB]     


 


Ibuprofen [Motrin 800 MG tab] 800 mg PO Q8HR PRN #20 tablet 08/26/16  Unknown Rx


 


ALBUTEROL Inhaler (OR & NICU) 2 puff IH QID PRN #1 inhalation 11/25/17  Unknown 

Rx





[Proair]     


 


levoFLOXacin [Levaquin] 750 mg PO QDAY #7 tablet 11/25/17  Unknown Rx


 


Nitrofurantoin Monohyd/M-Cryst 100 mg PO Q12H 3 Days #6 capsule 12/10/17  

Unknown Rx





[Macrobid 100 mg Capsule]     


 


Azithromycin [Zithromax Z-MODE] 250 mg PO DAILY #6 tablet 08/22/18  Unknown Rx


 


Benzonatate [Tessalon Perle] 100 mg PO Q8H PRN #20 capsule 08/22/18  Unknown Rx


 


Loratadine [Claritin] 10 mg PO DAILY #30 tablet 08/22/18  Unknown Rx


 


Ibuprofen [Motrin] 600 mg PO Q8H PRN #20 tablet 11/12/18  Unknown Rx


 


traMADol [Ultram 50 MG tab] 50 mg PO Q6HR PRN 3 Days #12 tablet 02/26/19  

Unknown Rx


 


Bepotastine Besilate [Bepreve 1.5%] 1 drop OU BID #1 drops 04/09/19  Unknown Rx


 


Cetirizine HCl [ZyrTEC] 10 mg PO DAILY #30 tab.rapdis 04/09/19  Unknown Rx


 


Fluticasone [Flonase] 1 spray NS QDAY #1 bottle 04/09/19  Unknown Rx


 


Naproxen [Naprosyn] 500 mg PO BID #20 tablet 06/17/19  Unknown Rx


 


methOCARBAMOL [Robaxin TAB] 500 mg PO Q8HR PRN #20 tablet 06/17/19  Unknown Rx


 


predniSONE [Deltasone] 50 mg PO QDAY #5 tab 06/17/19  Unknown Rx


 


traMADol [Ultram] 50 mg PO Q6HR PRN #7 tablet 06/17/19  Unknown Rx


 


Ketorolac [Toradol] 10 mg PO Q6H PRN #15 tablet 09/22/19  Unknown Rx


 


methOCARBAMOL [Robaxin] 750 mg PO Q8H PRN #21 tablet 09/22/19  Unknown Rx


 


predniSONE [Deltasone] 50 mg PO QDAY #5 tab 09/22/19  Unknown Rx


 


Doxycycline Hyclate [Doxycycline 100 mg PO BID 7 Days #14 tab 10/27/19  Unknown 

Rx





Hyclate TAB]     


 


Mupirocin [Bactroban 2% OINT] 1 applic TP TID #1 tube 10/27/19  Unknown Rx














ED Physical Exam





- General


Limitations: No Limitations


General appearance: alert, in no apparent distress





- Head


Head exam: Present: atraumatic, normocephalic





- Eye


Eye exam: Present: normal appearance





- ENT


ENT exam: Present: mucous membranes moist





- Neurological Exam


Neurological exam: Present: alert, oriented X3





- Psychiatric


Psychiatric exam: Present: normal affect, normal mood





- Skin


Skin exam: Present: warm, dry, other (erythema and increased warmth present to 

the chin with small areas of abrasions, no fluctuance, no drainage, no edema, no

necrosis, no blistering)





ED Course


                                   Vital Signs











  10/27/19 10/27/19





  19:25 19:39


 


Temperature 97.8 F 97.8 F


 


Pulse Rate 87 87


 


Respiratory 18 18





Rate  


 


Blood Pressure  108/72


 


Blood Pressure 108/72 





[Right]  


 


O2 Sat by Pulse 100 100





Oximetry  














ED Medical Decision Making





- Medical Decision Making





pt is a 60 yo female who presents to the ED with c/o skin rash to the chin that 

began a week ago. states she used an electronic skin hair removal on the chin 

two weeks ago. states initially she had tingling then broke out in a rash and 

scabbing. states she has associated itching/burning. she denies any fever, 

chills, n/v. pmhx CVA, migraines, depression, osteoporosis. allergy: ampicillin.

can take other penicillins with no issue. VSS. on exam: erythema and increased 

warmth present to the chin with small areas of abrasions, no fluctuance, no 

drainage, no edema, no necrosis, no blistering. examination consistent with 

cellulitis/skin irritation. no signs of abscess at this time. pt given 

prescription for mupirocen and doxycycline. advised pt to please use medication 

as prescribed. please use benadryl to avoid scratching and do not drive while 

taking it due to drowsiness. do not wear makeup on the face and do not use that 

hair removal kit again. follow up with a primary care doctor in 3 days for 

reexamination. return to the emergency room for any new or worsening symptoms.


Critical care attestation.: 


If time is entered above; I have spent that time in minutes in the direct care 

of this critically ill patient, excluding procedure time.








ED Disposition


Clinical Impression: 


Cellulitis


Qualifiers:


 Site of cellulitis: face Qualified Code(s): L03.211 - Cellulitis of face





Disposition: DC-01 TO HOME OR SELFCARE


Is pt being admited?: No


Does the pt Need Aspirin: No


Condition: Stable


Instructions:  Cellulitis (ED)


Additional Instructions: 


please use medication as prescribed. please use benadryl to avoid scratching and

do not drive while taking it due to drowsiness. do not wear makeup on the face 

and do not use that hair removal kit again. follow up with a primary care doctor

in 3 days for reexamination. return to the emergency room for any new or 

worsening symptoms.


Prescriptions: 


Mupirocin [Bactroban 2% OINT] 1 applic TP TID #1 tube


Doxycycline Hyclate [Doxycycline Hyclate TAB] 100 mg PO BID 7 Days #14 tab


Referrals: 


[facility] [Provider Group] - 2-3 Days


[facility] [Outside] - 2-3 Days


[facility] [Outside] - 2-3 Days


Time of Disposition: 19:44


Print Language: ENGLISH

## 2020-01-19 ENCOUNTER — HOSPITAL ENCOUNTER (EMERGENCY)
Dept: HOSPITAL 5 - ED | Age: 60
Discharge: HOME | End: 2020-01-19
Payer: SELF-PAY

## 2020-01-19 VITALS — SYSTOLIC BLOOD PRESSURE: 119 MMHG | DIASTOLIC BLOOD PRESSURE: 78 MMHG

## 2020-01-19 DIAGNOSIS — F32.9: ICD-10-CM

## 2020-01-19 DIAGNOSIS — Z86.73: ICD-10-CM

## 2020-01-19 DIAGNOSIS — Z90.710: ICD-10-CM

## 2020-01-19 DIAGNOSIS — Z79.899: ICD-10-CM

## 2020-01-19 DIAGNOSIS — M19.90: ICD-10-CM

## 2020-01-19 DIAGNOSIS — L25.9: Primary | ICD-10-CM

## 2020-01-19 DIAGNOSIS — Z88.1: ICD-10-CM

## 2020-01-19 DIAGNOSIS — F17.200: ICD-10-CM

## 2020-01-19 DIAGNOSIS — G43.909: ICD-10-CM

## 2020-01-19 NOTE — EMERGENCY DEPARTMENT REPORT
- General


Chief complaint: Skin Rash


Stated complaint: RASH/CHIN ITCHING/BURNING


Source: patient


Mode of arrival: Ambulatory


Limitations: No Limitations





- History of Present Illness


Initial comments: 





59-year-old female presents to the emergency room with complaints of dry cracked

HE skin dry cracked cheek..  Patient was seen back in October at this emergency 

room.  After using a swivel shaver to her chin.  She was treated with Bactroban 

and oral antibiotics.  Patient states she never really got any relief.  Her chin

skin remains dry and cracked and itchy.  She denies fever chills nausea vomiting

no chest pain or shortness of breath


MD complaint: rash


-: month(s)


Location: face


Consistency: constant


Improves with: none


Worsens with: none


Context: none


Associated symptoms: itching


Treatments Prior to Arrival: OTC topical medication





- Related Data


                                Home Medications











 Medication  Instructions  Recorded  Confirmed  Last Taken


 


Aspirin EC [Halfprin EC] 81 mg PO QDAY 06/09/15 11/18/15 Unknown








                                  Previous Rx's











 Medication  Instructions  Recorded  Last Taken  Type


 


Acetaminophen/Codeine [Tylenol #3] 1 tab PO Q6H PRN #20 tab 02/22/16 Unknown Rx


 


Cyclobenzaprine [Flexeril] 10 mg PO TID PRN #20 tablet 02/22/16 Unknown Rx


 


Naproxen [Naprosyn TAB] 500 mg PO BID #30 tablet 02/22/16 Unknown Rx


 


Azithromycin [Zithromax Z-MODE] 250 mg PO QDAY #6 tablet 06/14/16 Unknown Rx


 


Ibuprofen [Motrin] 800 mg PO Q8HR PRN #10 tablet 06/14/16 Unknown Rx


 


Promethazine /Codeine 5 ml PO Q6H PRN #90 ml 06/14/16 Unknown Rx





[Phenergan/Codeine 6.25-10 mg/5Ml]    


 


Cyclobenzaprine HCl [Flexeril 5 MG 5 mg PO TID #20 tab 08/26/16 Unknown Rx





TAB]    


 


Ibuprofen [Motrin 800 MG tab] 800 mg PO Q8HR PRN #20 tablet 08/26/16 Unknown Rx


 


Albuterol INH(or & Nicu Only) 2 puff IH QID PRN #1 inhalation 11/25/17 Unknown 

Rx





[Proair]    


 


levoFLOXacin [Levaquin] 750 mg PO QDAY #7 tablet 11/25/17 Unknown Rx


 


Nitrofurantoin Monohyd/M-Cryst 100 mg PO Q12H 3 Days #6 capsule 12/10/17 Unknown

 Rx





[Macrobid 100 mg Capsule]    


 


Azithromycin [Zithromax Z-MODE] 250 mg PO DAILY #6 tablet 08/22/18 Unknown Rx


 


Benzonatate [Tessalon Perle] 100 mg PO Q8H PRN #20 capsule 08/22/18 Unknown Rx


 


Loratadine (Nf) [Claritin] 10 mg PO DAILY #30 tablet 08/22/18 Unknown Rx


 


Ibuprofen [Motrin] 600 mg PO Q8H PRN #20 tablet 11/12/18 Unknown Rx


 


traMADoL [Ultram 50 MG tab] 50 mg PO Q6HR PRN 3 Days #12 tablet 02/26/19 Unknown

Rx


 


Bepotastine Besilate [Bepreve 1.5%] 1 drop OU BID #1 drops 04/09/19 Unknown Rx


 


Cetirizine HCl [ZyrTEC] 10 mg PO DAILY #30 tab.rapdis 04/09/19 Unknown Rx


 


Fluticasone [Flonase] 1 spray NS QDAY #1 bottle 04/09/19 Unknown Rx


 


Naproxen [Naprosyn] 500 mg PO BID #20 tablet 06/17/19 Unknown Rx


 


methOCARBAMOL [Robaxin TAB] 500 mg PO Q8HR PRN #20 tablet 06/17/19 Unknown Rx


 


predniSONE [Deltasone] 50 mg PO QDAY #5 tab 06/17/19 Unknown Rx


 


traMADoL [Ultram] 50 mg PO Q6HR PRN #7 tablet 06/17/19 Unknown Rx


 


Ketorolac [Toradol] 10 mg PO Q6H PRN #15 tablet 09/22/19 Unknown Rx


 


methOCARBAMOL [Robaxin] 750 mg PO Q8H PRN #21 tablet 09/22/19 Unknown Rx


 


predniSONE [Deltasone] 50 mg PO QDAY #5 tab 09/22/19 Unknown Rx


 


Doxycycline Hyclate [Doxycycline 100 mg PO BID 7 Days #14 tab 10/27/19 Unknown 

Rx





Hyclate TAB]    


 


Mupirocin [Bactroban 2% OINT] 1 applic TP TID #1 tube 10/27/19 Unknown Rx


 


Famotidine [Pepcid] 20 mg PO BID 5 Days #10 tablet 01/19/20 Unknown Rx


 


Hydroxyzine HCl [hydrOXYzine] 50 mg PO Q6H PRN #24 tablet 01/19/20 Unknown Rx


 


Triamcinolone 0.1% [Kenalog 0.1% 1 applic TP BID 10 Days #1 tube 01/19/20 

Unknown Rx





CREAM]    


 


predniSONE [Deltasone] 40 mg PO QDAY 5 Days #10 tab 01/19/20 Unknown Rx











                                    Allergies











Allergy/AdvReac Type Severity Reaction Status Date / Time


 


ampicillin Allergy  Itching Verified 08/18/15 20:17














Abscess Boil HPI





- HPI


Chief Complaint: Skin Rash


Stated Complaint: RASH/CHIN ITCHING/BURNING


Duration: >1 Week


Location: Other (chin)


Severity: Moderate


History: No Fever, No Pain, No Purulent Drainage, No Numbness, No Foreign Body, 

No Previous History, No Insect Bite


Home Medications: 


                                Home Medications











 Medication  Instructions  Recorded  Confirmed  Last Taken


 


Aspirin EC [Halfprin EC] 81 mg PO QDAY 06/09/15 11/18/15 Unknown








                                  Previous Rx's











 Medication  Instructions  Recorded  Last Taken  Type


 


Acetaminophen/Codeine [Tylenol #3] 1 tab PO Q6H PRN #20 tab 02/22/16 Unknown Rx


 


Cyclobenzaprine [Flexeril] 10 mg PO TID PRN #20 tablet 02/22/16 Unknown Rx


 


Naproxen [Naprosyn TAB] 500 mg PO BID #30 tablet 02/22/16 Unknown Rx


 


Azithromycin [Zithromax Z-MODE] 250 mg PO QDAY #6 tablet 06/14/16 Unknown Rx


 


Ibuprofen [Motrin] 800 mg PO Q8HR PRN #10 tablet 06/14/16 Unknown Rx


 


Promethazine /Codeine 5 ml PO Q6H PRN #90 ml 06/14/16 Unknown Rx





[Phenergan/Codeine 6.25-10 mg/5Ml]    


 


Cyclobenzaprine HCl [Flexeril 5 MG 5 mg PO TID #20 tab 08/26/16 Unknown Rx





TAB]    


 


Ibuprofen [Motrin 800 MG tab] 800 mg PO Q8HR PRN #20 tablet 08/26/16 Unknown Rx


 


Albuterol INH(or & Nicu Only) 2 puff IH QID PRN #1 inhalation 11/25/17 Unknown 

Rx





[Proair]    


 


levoFLOXacin [Levaquin] 750 mg PO QDAY #7 tablet 11/25/17 Unknown Rx


 


Nitrofurantoin Monohyd/M-Cryst 100 mg PO Q12H 3 Days #6 capsule 12/10/17 Unknown

 Rx





[Macrobid 100 mg Capsule]    


 


Azithromycin [Zithromax Z-MODE] 250 mg PO DAILY #6 tablet 08/22/18 Unknown Rx


 


Benzonatate [Tessalon Perle] 100 mg PO Q8H PRN #20 capsule 08/22/18 Unknown Rx


 


Loratadine (Nf) [Claritin] 10 mg PO DAILY #30 tablet 08/22/18 Unknown Rx


 


Ibuprofen [Motrin] 600 mg PO Q8H PRN #20 tablet 11/12/18 Unknown Rx


 


traMADoL [Ultram 50 MG tab] 50 mg PO Q6HR PRN 3 Days #12 tablet 02/26/19 Unknown

Rx


 


Bepotastine Besilate [Bepreve 1.5%] 1 drop OU BID #1 drops 04/09/19 Unknown Rx


 


Cetirizine HCl [ZyrTEC] 10 mg PO DAILY #30 tab.rapdis 04/09/19 Unknown Rx


 


Fluticasone [Flonase] 1 spray NS QDAY #1 bottle 04/09/19 Unknown Rx


 


Naproxen [Naprosyn] 500 mg PO BID #20 tablet 06/17/19 Unknown Rx


 


methOCARBAMOL [Robaxin TAB] 500 mg PO Q8HR PRN #20 tablet 06/17/19 Unknown Rx


 


predniSONE [Deltasone] 50 mg PO QDAY #5 tab 06/17/19 Unknown Rx


 


traMADoL [Ultram] 50 mg PO Q6HR PRN #7 tablet 06/17/19 Unknown Rx


 


Ketorolac [Toradol] 10 mg PO Q6H PRN #15 tablet 09/22/19 Unknown Rx


 


methOCARBAMOL [Robaxin] 750 mg PO Q8H PRN #21 tablet 09/22/19 Unknown Rx


 


predniSONE [Deltasone] 50 mg PO QDAY #5 tab 09/22/19 Unknown Rx


 


Doxycycline Hyclate [Doxycycline 100 mg PO BID 7 Days #14 tab 10/27/19 Unknown 

Rx





Hyclate TAB]    


 


Mupirocin [Bactroban 2% OINT] 1 applic TP TID #1 tube 10/27/19 Unknown Rx


 


Famotidine [Pepcid] 20 mg PO BID 5 Days #10 tablet 01/19/20 Unknown Rx


 


Hydroxyzine HCl [hydrOXYzine] 50 mg PO Q6H PRN #24 tablet 01/19/20 Unknown Rx


 


Triamcinolone 0.1% [Kenalog 0.1% 1 applic TP BID 10 Days #1 tube 01/19/20 

Unknown Rx





CREAM]    


 


predniSONE [Deltasone] 40 mg PO QDAY 5 Days #10 tab 01/19/20 Unknown Rx











Allergies/Adverse Reactions: 


                                    Allergies











Allergy/AdvReac Type Severity Reaction Status Date / Time


 


ampicillin Allergy  Itching Verified 08/18/15 20:17














ED Review of Systems


ROS: 


Stated complaint: RASH/CHIN ITCHING/BURNING


Other details as noted in HPI





Comment: All other systems reviewed and negative


Constitutional: denies: chills, fever


ENT: denies: ear pain, throat pain


Respiratory: denies: cough


Cardiovascular: denies: chest pain, palpitations


Skin: rash (dry patchy cracked skin )


Neurological: denies: as per HPI


Hematological/Lymphatic: denies: as per HPI





ED Past Medical Hx





- Past Medical History


Hx CVA: Yes (2010- NO RESIDUAL)


Hx Congestive Heart Failure: No


Hx Diabetes: No


Hx Arthritis: Yes


Hx Headaches / Migraines: Yes


Hx Psychiatric Treatment: Yes (depression)


Hx Asthma: No


Hx COPD: No


Hx HIV: No





- Surgical History


Hx Appendectomy: Yes


Additional Surgical History: hysterectomy, left ankle surgery, left carotid 

endarterectomy





- Social History


Smoking Status: Current Every Day Smoker


Substance Use Type: None





- Medications


Home Medications: 


                                Home Medications











 Medication  Instructions  Recorded  Confirmed  Last Taken  Type


 


Aspirin EC [Halfprin EC] 81 mg PO QDAY 06/09/15 11/18/15 Unknown History


 


Acetaminophen/Codeine [Tylenol #3] 1 tab PO Q6H PRN #20 tab 02/22/16  Unknown Rx


 


Cyclobenzaprine [Flexeril] 10 mg PO TID PRN #20 tablet 02/22/16  Unknown Rx


 


Naproxen [Naprosyn TAB] 500 mg PO BID #30 tablet 02/22/16  Unknown Rx


 


Azithromycin [Zithromax Z-MODE] 250 mg PO QDAY #6 tablet 06/14/16  Unknown Rx


 


Ibuprofen [Motrin] 800 mg PO Q8HR PRN #10 tablet 06/14/16  Unknown Rx


 


Promethazine /Codeine 5 ml PO Q6H PRN #90 ml 06/14/16  Unknown Rx





[Phenergan/Codeine 6.25-10 mg/5Ml]     


 


Cyclobenzaprine HCl [Flexeril 5 MG 5 mg PO TID #20 tab 08/26/16  Unknown Rx





TAB]     


 


Ibuprofen [Motrin 800 MG tab] 800 mg PO Q8HR PRN #20 tablet 08/26/16  Unknown Rx


 


Albuterol INH(or & Nicu Only) 2 puff IH QID PRN #1 inhalation 11/25/17  Unknown 

Rx





[Proair]     


 


levoFLOXacin [Levaquin] 750 mg PO QDAY #7 tablet 11/25/17  Unknown Rx


 


Nitrofurantoin Monohyd/M-Cryst 100 mg PO Q12H 3 Days #6 capsule 12/10/17  

Unknown Rx





[Macrobid 100 mg Capsule]     


 


Azithromycin [Zithromax Z-MODE] 250 mg PO DAILY #6 tablet 08/22/18  Unknown Rx


 


Benzonatate [Tessalon Perle] 100 mg PO Q8H PRN #20 capsule 08/22/18  Unknown Rx


 


Loratadine (Nf) [Claritin] 10 mg PO DAILY #30 tablet 08/22/18  Unknown Rx


 


Ibuprofen [Motrin] 600 mg PO Q8H PRN #20 tablet 11/12/18  Unknown Rx


 


traMADoL [Ultram 50 MG tab] 50 mg PO Q6HR PRN 3 Days #12 tablet 02/26/19  

Unknown Rx


 


Bepotastine Besilate [Bepreve 1.5%] 1 drop OU BID #1 drops 04/09/19  Unknown Rx


 


Cetirizine HCl [ZyrTEC] 10 mg PO DAILY #30 tab.rapdis 04/09/19  Unknown Rx


 


Fluticasone [Flonase] 1 spray NS QDAY #1 bottle 04/09/19  Unknown Rx


 


Naproxen [Naprosyn] 500 mg PO BID #20 tablet 06/17/19  Unknown Rx


 


methOCARBAMOL [Robaxin TAB] 500 mg PO Q8HR PRN #20 tablet 06/17/19  Unknown Rx


 


predniSONE [Deltasone] 50 mg PO QDAY #5 tab 06/17/19  Unknown Rx


 


traMADoL [Ultram] 50 mg PO Q6HR PRN #7 tablet 06/17/19  Unknown Rx


 


Ketorolac [Toradol] 10 mg PO Q6H PRN #15 tablet 09/22/19  Unknown Rx


 


methOCARBAMOL [Robaxin] 750 mg PO Q8H PRN #21 tablet 09/22/19  Unknown Rx


 


predniSONE [Deltasone] 50 mg PO QDAY #5 tab 09/22/19  Unknown Rx


 


Doxycycline Hyclate [Doxycycline 100 mg PO BID 7 Days #14 tab 10/27/19  Unknown 

Rx





Hyclate TAB]     


 


Mupirocin [Bactroban 2% OINT] 1 applic TP TID #1 tube 10/27/19  Unknown Rx


 


Famotidine [Pepcid] 20 mg PO BID 5 Days #10 tablet 01/19/20  Unknown Rx


 


Hydroxyzine HCl [hydrOXYzine] 50 mg PO Q6H PRN #24 tablet 01/19/20  Unknown Rx


 


Triamcinolone 0.1% [Kenalog 0.1% 1 applic TP BID 10 Days #1 tube 01/19/20  

Unknown Rx





CREAM]     


 


predniSONE [Deltasone] 40 mg PO QDAY 5 Days #10 tab 01/19/20  Unknown Rx














ED Physical Exam





- General


Limitations: No Limitations


General appearance: alert, in no apparent distress





- Head


Head exam: Present: atraumatic





- Eye


Eye exam: Present: normal appearance





- ENT


ENT exam: Present: mucous membranes moist, TM's normal bilaterally, other (chin 

with dry patchy cracked coarse skin . no drainage , no erythema no warmth)





- Neck


Neck exam: Present: normal inspection





- Respiratory


Respiratory exam: Present: normal lung sounds bilaterally.  Absent: respiratory 

distress, wheezes, rales, rhonchi





- Cardiovascular


Cardiovascular Exam: Present: regular rate





- GI/Abdominal


GI/Abdominal exam: Absent: soft





- 


External exam: Present: normal external exam





- Extremities Exam


Extremities exam: Present: normal inspection





- Neurological Exam


Neurological exam: Present: alert





- Psychiatric


Psychiatric exam: Present: normal affect





- Skin


Skin exam: Present: dry.  Absent: warm





ED Course


                                   Vital Signs











  01/19/20 01/19/20 01/19/20





  08:57 09:01 14:12


 


Temperature  97.4 F L 


 


Pulse Rate 83  85


 


Respiratory   18





Rate   


 


Blood Pressure 119/78  


 


O2 Sat by Pulse 98  96





Oximetry   














ED Medical Decision Making





- Medical Decision Making





This 59-year-old female presented to the ER with complaint of ongoing irritation

 and itching and dry A/.  Patient was seen in this emergency room back in 

October after using and use the shave her.  She was treated for cellulitis of 

the chin with Bactroban and oral antibiotics.  Her symptoms she said it 

initially improved but she has ongoing itching dry patchy skin irritation.  This

 appears to be a contact dermatitis plan to treat patient with topical steroids 

5 days of prednisone orally hydroxyzine for the itching and follow up with 

primary care doctor in a week


Critical Care Time: No


Critical care attestation.: 


If time is entered above; I have spent that time in minutes in the direct care 

of this critically ill patient, excluding procedure time.








ED Disposition


Clinical Impression: 


Contact dermatitis


Qualifiers:


 Contact dermatitis type: irritant Contact dermatitis trigger: metal Qualified 

Code(s): L24.81 - Irritant contact dermatitis due to metals





Disposition: DC-01 TO HOME OR SELFCARE


Is pt being admited?: No


Does the pt Need Aspirin: No


Condition: Stable


Instructions:  Contact Dermatitis (ED)


Prescriptions: 


predniSONE [Deltasone] 40 mg PO QDAY 5 Days #10 tab


Hydroxyzine HCl [hydrOXYzine] 50 mg PO Q6H PRN #24 tablet


 PRN Reason: Itching


Triamcinolone 0.1% [Kenalog 0.1% CREAM] 1 applic TP BID 10 Days #1 tube


Famotidine [Pepcid] 20 mg PO BID 5 Days #10 tablet


Referrals: 


PRIMARY CARE,MD [Primary Care Provider] - 3-5 Days


Time of Disposition: 13:59

## 2021-02-10 ENCOUNTER — HOSPITAL ENCOUNTER (EMERGENCY)
Dept: HOSPITAL 5 - ED | Age: 61
Discharge: HOME | End: 2021-02-10
Payer: SELF-PAY

## 2021-02-10 VITALS — SYSTOLIC BLOOD PRESSURE: 185 MMHG | DIASTOLIC BLOOD PRESSURE: 99 MMHG
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
## 2021-02-10 DIAGNOSIS — Z90.49: ICD-10-CM

## 2021-02-10 DIAGNOSIS — Z88.1: ICD-10-CM
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
## 2021-02-10 DIAGNOSIS — F32.9: ICD-10-CM

## 2021-02-10 DIAGNOSIS — Z79.82: ICD-10-CM

## 2021-02-10 DIAGNOSIS — F17.200: ICD-10-CM
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
## 2021-02-10 DIAGNOSIS — Z79.899: ICD-10-CM
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
## 2021-02-10 DIAGNOSIS — Z98.890: ICD-10-CM

## 2021-02-10 DIAGNOSIS — S60.211A: Primary | ICD-10-CM

## 2021-02-10 DIAGNOSIS — Z86.73: ICD-10-CM

## 2021-02-10 DIAGNOSIS — Z90.710: ICD-10-CM

## 2021-02-10 DIAGNOSIS — M19.90: ICD-10-CM

## 2021-02-10 PROCEDURE — 99283 EMERGENCY DEPT VISIT LOW MDM: CPT

## 2021-02-10 NOTE — EMERGENCY DEPARTMENT REPORT
ED Upper Extremity Inj HPI





- General


Chief Complaint: Extremity Injury, Upper


Stated Complaint: RT HAND CHECK UP


Time Seen by Provider: 02/10/21 15:08


Source: patient


Mode of arrival: Ambulatory


Limitations: No Limitations





- History of Present Illness


Initial Comments: 





This pleasant 60-year-old female presents the emergency department chief 

complaint of right hand pain.  Patient reports had a fall 2 weeks ago and was 

seen in an emergency department and was told she had a fracture and splinted.  

She followed up with the urgent care and they said there was not a fracture.  

She states she still having pain and is unable to afford seeing an orthopedist 

and needs to go back to work but is unable to work due to her being right-handed

and the splint being on her right hand.  She denies any other injuries.  She 

denies any associated fever, chills, night sweats, headache, dizziness, blurry 

vision, nausea, vomit, diarrhea, chest pain, shortness of breath or any other 

associated symptoms.





- Related Data


                                Home Medications











 Medication  Instructions  Recorded  Confirmed  Last Taken


 


Aspirin EC [Halfprin EC] 81 mg PO QDAY 06/09/15 11/18/15 Unknown








                                  Previous Rx's











 Medication  Instructions  Recorded  Last Taken  Type


 


Acetaminophen/Codeine [Tylenol #3] 1 tab PO Q6H PRN #20 tab 16 Unknown Rx


 


Cyclobenzaprine [Flexeril] 10 mg PO TID PRN #20 tablet 16 Unknown Rx


 


Naproxen [Naprosyn TAB] 500 mg PO BID #30 tablet 16 Unknown Rx


 


Azithromycin [Zithromax Z-MODE] 250 mg PO QDAY #6 tablet 16 Unknown Rx


 


Ibuprofen [Motrin] 800 mg PO Q8HR PRN #10 tablet 16 Unknown Rx


 


Promethazine /Codeine 5 ml PO Q6H PRN #90 ml 16 Unknown Rx





[Phenergan/Codeine 6.25-10 mg/5Ml]    


 


Cyclobenzaprine HCl [Flexeril 5 MG 5 mg PO TID #20 tab 16 Unknown Rx





TAB]    


 


Ibuprofen [Motrin 800 MG tab] 800 mg PO Q8HR PRN #20 tablet 16 Unknown Rx


 


Albuterol Mdi (or & Nicu Only) 2 puff IH QID PRN #1 inhalation 11/25/17 Unknown 

Rx





[Proair]    


 


levoFLOXacin [Levaquin] 750 mg PO QDAY #7 tablet 17 Unknown Rx


 


Nitrofurantoin Monohyd/M-Cryst 100 mg PO Q12H 3 Days #6 capsule 12/10/17 Unknown

 Rx





[Macrobid 100 mg Capsule]    


 


Azithromycin [Zithromax Z-MODE] 250 mg PO DAILY #6 tablet 18 Unknown Rx


 


Benzonatate [Tessalon Perle] 100 mg PO Q8H PRN #20 capsule 18 Unknown Rx


 


Loratadine (Nf) [Claritin] 10 mg PO DAILY #30 tablet 18 Unknown Rx


 


Ibuprofen [Motrin] 600 mg PO Q8H PRN #20 tablet 18 Unknown Rx


 


traMADoL [Ultram 50 MG tab] 50 mg PO Q6HR PRN 3 Days #12 tablet 19 Unknown

Rx


 


Bepotastine Besilate [Bepreve 1.5%] 1 drop OU BID #1 drops 19 Unknown Rx


 


Cetirizine HCl [ZyrTEC] 10 mg PO DAILY #30 tab.rapdis 19 Unknown Rx


 


Fluticasone [Flonase] 1 spray NS QDAY #1 bottle 19 Unknown Rx


 


Naproxen [Naprosyn] 500 mg PO BID #20 tablet 19 Unknown Rx


 


methOCARBAMOL [Robaxin TAB] 500 mg PO Q8HR PRN #20 tablet 19 Unknown Rx


 


predniSONE [Deltasone] 50 mg PO QDAY #5 tab 19 Unknown Rx


 


traMADoL [Ultram] 50 mg PO Q6HR PRN #7 tablet 19 Unknown Rx


 


Ketorolac [Toradol] 10 mg PO Q6H PRN #15 tablet 19 Unknown Rx


 


methOCARBAMOL [Robaxin] 750 mg PO Q8H PRN #21 tablet 19 Unknown Rx


 


predniSONE [Deltasone] 50 mg PO QDAY #5 tab 19 Unknown Rx


 


Doxycycline Hyclate [Doxycycline 100 mg PO BID 7 Days #14 tab 10/27/19 Unknown 

Rx





Hyclate TAB]    


 


Mupirocin [Bactroban 2% OINT] 1 applic TP TID #1 tube 10/27/19 Unknown Rx


 


Famotidine [Pepcid] 20 mg PO BID 5 Days #10 tablet 20 Unknown Rx


 


Hydroxyzine HCl [hydrOXYzine] 50 mg PO Q6H PRN #24 tablet 20 Unknown Rx


 


Triamcinolone 0.1% [Kenalog 0.1% 1 applic TP BID 10 Days #1 tube 20 

Unknown Rx





CREAM]    


 


predniSONE [Deltasone] 40 mg PO QDAY 5 Days #10 tab 20 Unknown Rx











                                    Allergies











Allergy/AdvReac Type Severity Reaction Status Date / Time


 


ampicillin Allergy  Itching Verified 08/18/15 20:17














ED Review of Systems


ROS: 


Stated complaint: RT HAND CHECK UP


Other details as noted in HPI





Comment: All other systems reviewed and negative


Constitutional: denies: chills, fever


Eyes: denies: eye pain, eye discharge, vision change


ENT: denies: ear pain, throat pain


Respiratory: denies: cough, shortness of breath, wheezing


Cardiovascular: denies: chest pain, palpitations


Endocrine: no symptoms reported


Gastrointestinal: denies: abdominal pain, nausea, diarrhea


Genitourinary: denies: urgency, dysuria, discharge


Musculoskeletal: arthralgia.  denies: back pain, joint swelling


Skin: denies: rash, lesions


Neurological: denies: headache, weakness, paresthesias


Psychiatric: denies: anxiety, depression


Hematological/Lymphatic: denies: easy bleeding, easy bruising





ED Past Medical Hx





- Past Medical History


Previous Medical History?: Yes


Hx CVA: Yes (- NO RESIDUAL)


Hx Congestive Heart Failure: No


Hx Diabetes: No


Hx Arthritis: Yes


Hx Headaches / Migraines: Yes


Hx Psychiatric Treatment: Yes (depression)


Hx Asthma: No


Hx COPD: No


Hx HIV: No





- Surgical History


Past Surgical History?: Yes


Hx Appendectomy: Yes


Additional Surgical History: hysterectomy, left ankle surgery, left carotid en

darterectomy





- Social History


Smoking Status: Current Every Day Smoker


Substance Use Type: None





- Medications


Home Medications: 


                                Home Medications











 Medication  Instructions  Recorded  Confirmed  Last Taken  Type


 


Aspirin EC [Halfprin EC] 81 mg PO QDAY 06/09/15 11/18/15 Unknown History


 


Acetaminophen/Codeine [Tylenol #3] 1 tab PO Q6H PRN #20 tab 16  Unknown Rx


 


Cyclobenzaprine [Flexeril] 10 mg PO TID PRN #20 tablet 16  Unknown Rx


 


Naproxen [Naprosyn TAB] 500 mg PO BID #30 tablet 16  Unknown Rx


 


Azithromycin [Zithromax Z-MODE] 250 mg PO QDAY #6 tablet 16  Unknown Rx


 


Ibuprofen [Motrin] 800 mg PO Q8HR PRN #10 tablet 16  Unknown Rx


 


Promethazine /Codeine 5 ml PO Q6H PRN #90 ml 16  Unknown Rx





[Phenergan/Codeine 6.25-10 mg/5Ml]     


 


Cyclobenzaprine HCl [Flexeril 5 MG 5 mg PO TID #20 tab 16  Unknown Rx





TAB]     


 


Ibuprofen [Motrin 800 MG tab] 800 mg PO Q8HR PRN #20 tablet 16  Unknown Rx


 


Albuterol Mdi (or & Nicu Only) 2 puff IH QID PRN #1 inhalation 17  Unknown

Rx





[Proair]     


 


levoFLOXacin [Levaquin] 750 mg PO QDAY #7 tablet 17  Unknown Rx


 


Nitrofurantoin Monohyd/M-Cryst 100 mg PO Q12H 3 Days #6 capsule 12/10/17  

Unknown Rx





[Macrobid 100 mg Capsule]     


 


Azithromycin [Zithromax Z-MODE] 250 mg PO DAILY #6 tablet 18  Unknown Rx


 


Benzonatate [Tessalon Perle] 100 mg PO Q8H PRN #20 capsule 18  Unknown Rx


 


Loratadine (Nf) [Claritin] 10 mg PO DAILY #30 tablet 18  Unknown Rx


 


Ibuprofen [Motrin] 600 mg PO Q8H PRN #20 tablet 18  Unknown Rx


 


traMADoL [Ultram 50 MG tab] 50 mg PO Q6HR PRN 3 Days #12 tablet 19  

Unknown Rx


 


Bepotastine Besilate [Bepreve 1.5%] 1 drop OU BID #1 drops 19  Unknown Rx


 


Cetirizine HCl [ZyrTEC] 10 mg PO DAILY #30 tab.rapdis 19  Unknown Rx


 


Fluticasone [Flonase] 1 spray NS QDAY #1 bottle 19  Unknown Rx


 


Naproxen [Naprosyn] 500 mg PO BID #20 tablet 19  Unknown Rx


 


methOCARBAMOL [Robaxin TAB] 500 mg PO Q8HR PRN #20 tablet 19  Unknown Rx


 


predniSONE [Deltasone] 50 mg PO QDAY #5 tab 19  Unknown Rx


 


traMADoL [Ultram] 50 mg PO Q6HR PRN #7 tablet 19  Unknown Rx


 


Ketorolac [Toradol] 10 mg PO Q6H PRN #15 tablet 19  Unknown Rx


 


methOCARBAMOL [Robaxin] 750 mg PO Q8H PRN #21 tablet 19  Unknown Rx


 


predniSONE [Deltasone] 50 mg PO QDAY #5 tab 19  Unknown Rx


 


Doxycycline Hyclate [Doxycycline 100 mg PO BID 7 Days #14 tab 10/27/19  Unknown 

Rx





Hyclate TAB]     


 


Mupirocin [Bactroban 2% OINT] 1 applic TP TID #1 tube 10/27/19  Unknown Rx


 


Famotidine [Pepcid] 20 mg PO BID 5 Days #10 tablet 20  Unknown Rx


 


Hydroxyzine HCl [hydrOXYzine] 50 mg PO Q6H PRN #24 tablet 20  Unknown Rx


 


Triamcinolone 0.1% [Kenalog 0.1% 1 applic TP BID 10 Days #1 tube 20  

Unknown Rx





CREAM]     


 


predniSONE [Deltasone] 40 mg PO QDAY 5 Days #10 tab 20  Unknown Rx














ED Physical Exam





- General


Limitations: No Limitations


General appearance: alert, in no apparent distress





- Head


Head exam: Present: atraumatic, normocephalic





- Eye


Eye exam: Present: normal appearance, PERRL, EOMI


Pupils: Present: normal accommodation





- ENT


ENT exam: Present: normal exam, normal orophraynx, mucous membranes moist





- Neck


Neck exam: Present: normal inspection.  Absent: tenderness, meningismus





- Respiratory


Respiratory exam: Present: normal lung sounds bilaterally.  Absent: respiratory 

distress, chest wall tenderness





- Cardiovascular


Cardiovascular Exam: Present: regular rate, normal rhythm, normal heart sounds. 

 Absent: systolic murmur, diastolic murmur, rubs, gallop





- GI/Abdominal


GI/Abdominal exam: Present: soft, normal bowel sounds.  Absent: distended, 

tenderness, guarding, rebound, rigid





- Extremities Exam


Extremities exam: Present: normal inspection, full ROM, tenderness (Mild 

tenderness over the MCP of the second and third digit on the right hand.  Normal

 distalization capillary refill.  Full active range of motion at DIP PIP and MCP

 joint of the hand.  No anatomical snuffbox tenderness.  No tenderness of the 

wrist.  Normal distal sensation capillary refill.)





- Back Exam


Back exam: Present: normal inspection, full ROM.  Absent: tenderness





- Neurological Exam


Neurological exam: Present: alert, oriented X3, normal gait





- Psychiatric


Psychiatric exam: Present: normal affect, normal mood





- Skin


Skin exam: Present: warm, dry, intact, normal color.  Absent: rash





ED Course


                                   Vital Signs











  02/10/21





  15:04


 


Temperature 98.6 F


 


Pulse Rate 100 H


 


Respiratory 16





Rate 


 


Blood Pressure 185/99


 


O2 Sat by Pulse 98





Oximetry 














ED Medical Decision Making





- Radiology Data


Radiology results: report reviewed, image reviewed





XRay Report 


Signed 





 Patient: ELBERT RAMOS MR#: [id number] 


 [id number] 


 : 1960 Acct:[id number] 





 Age/Sex: 60 / F ADM Date: 02/10/21 





 Loc: ED 


 Attending Dr: 








 Ordering Physician: NIC DE LA ROSA 


 Date of Service: 02/10/21 


 Procedure(s): XR hand 2V RT 


 Accession Number(s): [id number] 





 cc: NIC DE LA ROSA 





 Fluoro Time In Minutes: 





 RIGHT HAND 2 VIEW(S) 





INDICATION / CLINICAL INFORMATION: 


fall, pain 





COMPARISON: 


None available. 





FINDINGS: 





BONES / JOINT(S): No acute fracture or subluxation. Moderate scattered 

osteoarthritis of the right 


 hand, most pronounced at the thumb CMC joint. 





SOFT TISSUES: No significant abnormality. 





ADDITIONAL FINDINGS: None. 





IMPRESSION: 


No acute osseous findings of the right hand. 





Signer Name: Kyle Crawley MD 


Signed: 2/10/2021 3:53 PM 


Workstation Name: [id number] 








 Transcribed By: JS 


 Dictated By: KYLE CRAWLEY MD 


 Electronically Authenticated By: KYLE CRAWLEY MD 


 Signed Date/Time: 02/10/21 2706 











- Medical Decision Making





X-ray is negative for acute findings.  I still recommended the patient that she 

follow-up with an orthopedist to have a proper evaluation.  Splint was removed 

and recommended immobilization however the patient politely declined and wanted 

a brace instead.  Recommend she follow-up she verbalized understand the 

diagnosis, treatment plan and follow-up instructions and all of her questions 

were answered.





- Differential Diagnosis


sprain, strain, fracture 


Critical care attestation.: 


If time is entered above; I have spent that time in minutes in the direct care 

of this critically ill patient, excluding procedure time.








ED Disposition


Clinical Impression: 


Contusion of right wrist


Qualifiers:


 Encounter type: initial encounter Qualified Code(s): S60.211A - Contusion of 

right wrist, initial encounter





Disposition: DC-01 TO HOME OR SELFCARE


Is pt being admited?: No


Condition: Stable


Referrals: 


ANUJ ALEJO MD [Staff Physician] - 3-5 Days


Forms:  Work/School Release Form(ED)


Time of Disposition: 16:22

## 2021-02-10 NOTE — XRAY REPORT
RIGHT HAND 2 VIEW(S)



INDICATION / CLINICAL INFORMATION:

fall, pain



COMPARISON:

None available.

 

FINDINGS:



BONES / JOINT(S): No acute fracture or subluxation. Moderate scattered osteoarthritis of the right ha
nd, most pronounced at the thumb CMC joint.



SOFT TISSUES: No significant abnormality.



ADDITIONAL FINDINGS: None.



IMPRESSION:

No acute osseous findings of the right hand.



Signer Name: Richmond Crawley MD 

Signed: 2/10/2021 3:53 PM

Workstation Name: [email or web address]

## 2021-04-04 ENCOUNTER — HOSPITAL ENCOUNTER (EMERGENCY)
Dept: HOSPITAL 5 - ED | Age: 61
Discharge: HOME | End: 2021-04-04
Payer: SELF-PAY

## 2021-04-04 VITALS — SYSTOLIC BLOOD PRESSURE: 163 MMHG | DIASTOLIC BLOOD PRESSURE: 100 MMHG

## 2021-04-04 DIAGNOSIS — Z88.0: ICD-10-CM

## 2021-04-04 DIAGNOSIS — Z98.890: ICD-10-CM

## 2021-04-04 DIAGNOSIS — G43.909: ICD-10-CM

## 2021-04-04 DIAGNOSIS — F32.9: ICD-10-CM

## 2021-04-04 DIAGNOSIS — Z90.49: ICD-10-CM

## 2021-04-04 DIAGNOSIS — Z90.710: ICD-10-CM

## 2021-04-04 DIAGNOSIS — Z86.73: ICD-10-CM

## 2021-04-04 DIAGNOSIS — M19.90: ICD-10-CM

## 2021-04-04 DIAGNOSIS — K02.9: Primary | ICD-10-CM

## 2021-04-04 DIAGNOSIS — K04.7: ICD-10-CM

## 2021-04-04 DIAGNOSIS — Z79.899: ICD-10-CM

## 2021-04-04 PROCEDURE — 99282 EMERGENCY DEPT VISIT SF MDM: CPT

## 2021-04-04 NOTE — EMERGENCY DEPARTMENT REPORT
ED ENT HPI





- General


Chief complaint: Dental/Oral


Stated complaint: TOOTHACHE/MOUTH SWOLLEN/NAUSEA


Time Seen by Provider: 04/04/21 02:29


Source: patient


Mode of arrival: Ambulatory


Limitations: No Limitations





- History of Present Illness


Initial comments: 





Patient is a 61-year-old female presents emergency room complaints of left lower

dental pain that began a few days ago.  She states that she has a broken tooth 

present in that region.  She states that she has noticed some left lower jaw 

swelling.  She has associated nausea.  She states that she last saw a dentist 

approximately a year ago.  She states that she needs to have a tooth extraction 

performed.  She denies any fever, vomiting, difficulty swallowing, difficulty 

breathing.  Past medical history of arthritis, CVA, migraines, depression.  

Allergy to ampicillin, she states that she can take penicillin with no issues.





- Related Data


                                Home Medications











 Medication  Instructions  Recorded  Confirmed  Last Taken


 


Aspirin EC [Halfprin EC] 81 mg PO QDAY 06/09/15 11/18/15 Unknown








                                  Previous Rx's











 Medication  Instructions  Recorded  Last Taken  Type


 


Acetaminophen/Codeine [Tylenol #3] 1 tab PO Q6H PRN #20 tab 02/22/16 Unknown Rx


 


Cyclobenzaprine [Flexeril] 10 mg PO TID PRN #20 tablet 02/22/16 Unknown Rx


 


Naproxen [Naprosyn TAB] 500 mg PO BID #30 tablet 02/22/16 Unknown Rx


 


Azithromycin [Zithromax Z-MODE] 250 mg PO QDAY #6 tablet 06/14/16 Unknown Rx


 


Ibuprofen [Motrin] 800 mg PO Q8HR PRN #10 tablet 06/14/16 Unknown Rx


 


Promethazine /Codeine 5 ml PO Q6H PRN #90 ml 06/14/16 Unknown Rx





[Phenergan/Codeine 6.25-10 mg/5Ml]    


 


Cyclobenzaprine HCl [Flexeril 5 MG 5 mg PO TID #20 tab 08/26/16 Unknown Rx





TAB]    


 


Ibuprofen [Motrin 800 MG tab] 800 mg PO Q8HR PRN #20 tablet 08/26/16 Unknown Rx


 


Albuterol Mdi (or & Nicu Only) 2 puff IH QID PRN #1 inhalation 11/25/17 Unknown 

Rx





[Proair]    


 


levoFLOXacin [Levaquin] 750 mg PO QDAY #7 tablet 11/25/17 Unknown Rx


 


Nitrofurantoin Monohyd/M-Cryst 100 mg PO Q12H 3 Days #6 capsule 12/10/17 Unknown

 Rx





[Macrobid 100 mg Capsule]    


 


Azithromycin [Zithromax Z-MODE] 250 mg PO DAILY #6 tablet 08/22/18 Unknown Rx


 


Benzonatate [Tessalon Perle] 100 mg PO Q8H PRN #20 capsule 08/22/18 Unknown Rx


 


Loratadine (Nf) [Claritin] 10 mg PO DAILY #30 tablet 08/22/18 Unknown Rx


 


Ibuprofen [Motrin] 600 mg PO Q8H PRN #20 tablet 11/12/18 Unknown Rx


 


traMADoL [Ultram 50 MG tab] 50 mg PO Q6HR PRN 3 Days #12 tablet 02/26/19 Unknown

Rx


 


Bepotastine Besilate [Bepreve 1.5%] 1 drop OU BID #1 drops 04/09/19 Unknown Rx


 


Cetirizine HCl [ZyrTEC] 10 mg PO DAILY #30 tab.rapdis 04/09/19 Unknown Rx


 


Fluticasone [Flonase] 1 spray NS QDAY #1 bottle 04/09/19 Unknown Rx


 


Naproxen [Naprosyn] 500 mg PO BID #20 tablet 06/17/19 Unknown Rx


 


methOCARBAMOL [Robaxin TAB] 500 mg PO Q8HR PRN #20 tablet 06/17/19 Unknown Rx


 


predniSONE [Deltasone] 50 mg PO QDAY #5 tab 06/17/19 Unknown Rx


 


traMADoL [Ultram] 50 mg PO Q6HR PRN #7 tablet 06/17/19 Unknown Rx


 


Ketorolac [Toradol] 10 mg PO Q6H PRN #15 tablet 09/22/19 Unknown Rx


 


methOCARBAMOL [Robaxin] 750 mg PO Q8H PRN #21 tablet 09/22/19 Unknown Rx


 


predniSONE [Deltasone] 50 mg PO QDAY #5 tab 09/22/19 Unknown Rx


 


Doxycycline Hyclate [Doxycycline 100 mg PO BID 7 Days #14 tab 10/27/19 Unknown 

Rx





Hyclate TAB]    


 


Mupirocin [Bactroban 2% OINT] 1 applic TP TID #1 tube 10/27/19 Unknown Rx


 


Famotidine [Pepcid] 20 mg PO BID 5 Days #10 tablet 01/19/20 Unknown Rx


 


Hydroxyzine HCl [hydrOXYzine] 50 mg PO Q6H PRN #24 tablet 01/19/20 Unknown Rx


 


Triamcinolone 0.1% [Kenalog 0.1% 1 applic TP BID 10 Days #1 tube 01/19/20 

Unknown Rx





CREAM]    


 


predniSONE [Deltasone] 40 mg PO QDAY 5 Days #10 tab 01/19/20 Unknown Rx


 


Chlorhexidine Mouthwash [Peridex] 15 ml MM BID #1 bottle 04/04/21 Unknown Rx


 


Naproxen 375 mg PO BID PRN #14 tablet. 04/04/21 Unknown Rx


 


Penicillin Vk [Veetids TAB] 500 mg PO QID 7 Days #56 tablet 04/04/21 Unknown Rx











                                    Allergies











Allergy/AdvReac Type Severity Reaction Status Date / Time


 


ampicillin Allergy  Itching Verified 08/18/15 20:17














ED Dental HPI





- General


Chief complaint: Dental/Oral


Stated complaint: TOOTHACHE/MOUTH SWOLLEN/NAUSEA


Time Seen by Provider: 04/04/21 02:29


Source: patient


Mode of arrival: Ambulatory


Limitations: No Limitations





- Related Data


                                Home Medications











 Medication  Instructions  Recorded  Confirmed  Last Taken


 


Aspirin EC [Halfprin EC] 81 mg PO QDAY 06/09/15 11/18/15 Unknown








                                  Previous Rx's











 Medication  Instructions  Recorded  Last Taken  Type


 


Acetaminophen/Codeine [Tylenol #3] 1 tab PO Q6H PRN #20 tab 02/22/16 Unknown Rx


 


Cyclobenzaprine [Flexeril] 10 mg PO TID PRN #20 tablet 02/22/16 Unknown Rx


 


Naproxen [Naprosyn TAB] 500 mg PO BID #30 tablet 02/22/16 Unknown Rx


 


Azithromycin [Zithromax Z-MODE] 250 mg PO QDAY #6 tablet 06/14/16 Unknown Rx


 


Ibuprofen [Motrin] 800 mg PO Q8HR PRN #10 tablet 06/14/16 Unknown Rx


 


Promethazine /Codeine 5 ml PO Q6H PRN #90 ml 06/14/16 Unknown Rx





[Phenergan/Codeine 6.25-10 mg/5Ml]    


 


Cyclobenzaprine HCl [Flexeril 5 MG 5 mg PO TID #20 tab 08/26/16 Unknown Rx





TAB]    


 


Ibuprofen [Motrin 800 MG tab] 800 mg PO Q8HR PRN #20 tablet 08/26/16 Unknown Rx


 


Albuterol Mdi (or & Nicu Only) 2 puff IH QID PRN #1 inhalation 11/25/17 Unknown 

Rx





[Proair]    


 


levoFLOXacin [Levaquin] 750 mg PO QDAY #7 tablet 11/25/17 Unknown Rx


 


Nitrofurantoin Monohyd/M-Cryst 100 mg PO Q12H 3 Days #6 capsule 12/10/17 Unknown

 Rx





[Macrobid 100 mg Capsule]    


 


Azithromycin [Zithromax Z-MODE] 250 mg PO DAILY #6 tablet 08/22/18 Unknown Rx


 


Benzonatate [Tessalon Perle] 100 mg PO Q8H PRN #20 capsule 08/22/18 Unknown Rx


 


Loratadine (Nf) [Claritin] 10 mg PO DAILY #30 tablet 08/22/18 Unknown Rx


 


Ibuprofen [Motrin] 600 mg PO Q8H PRN #20 tablet 11/12/18 Unknown Rx


 


traMADoL [Ultram 50 MG tab] 50 mg PO Q6HR PRN 3 Days #12 tablet 02/26/19 Unknown

Rx


 


Bepotastine Besilate [Bepreve 1.5%] 1 drop OU BID #1 drops 04/09/19 Unknown Rx


 


Cetirizine HCl [ZyrTEC] 10 mg PO DAILY #30 tab.rapdis 04/09/19 Unknown Rx


 


Fluticasone [Flonase] 1 spray NS QDAY #1 bottle 04/09/19 Unknown Rx


 


Naproxen [Naprosyn] 500 mg PO BID #20 tablet 06/17/19 Unknown Rx


 


methOCARBAMOL [Robaxin TAB] 500 mg PO Q8HR PRN #20 tablet 06/17/19 Unknown Rx


 


predniSONE [Deltasone] 50 mg PO QDAY #5 tab 06/17/19 Unknown Rx


 


traMADoL [Ultram] 50 mg PO Q6HR PRN #7 tablet 06/17/19 Unknown Rx


 


Ketorolac [Toradol] 10 mg PO Q6H PRN #15 tablet 09/22/19 Unknown Rx


 


methOCARBAMOL [Robaxin] 750 mg PO Q8H PRN #21 tablet 09/22/19 Unknown Rx


 


predniSONE [Deltasone] 50 mg PO QDAY #5 tab 09/22/19 Unknown Rx


 


Doxycycline Hyclate [Doxycycline 100 mg PO BID 7 Days #14 tab 10/27/19 Unknown 

Rx





Hyclate TAB]    


 


Mupirocin [Bactroban 2% OINT] 1 applic TP TID #1 tube 10/27/19 Unknown Rx


 


Famotidine [Pepcid] 20 mg PO BID 5 Days #10 tablet 01/19/20 Unknown Rx


 


Hydroxyzine HCl [hydrOXYzine] 50 mg PO Q6H PRN #24 tablet 01/19/20 Unknown Rx


 


Triamcinolone 0.1% [Kenalog 0.1% 1 applic TP BID 10 Days #1 tube 01/19/20 

Unknown Rx





CREAM]    


 


predniSONE [Deltasone] 40 mg PO QDAY 5 Days #10 tab 01/19/20 Unknown Rx


 


Chlorhexidine Mouthwash [Peridex] 15 ml MM BID #1 bottle 04/04/21 Unknown Rx


 


Naproxen 375 mg PO BID PRN #14 tablet. 04/04/21 Unknown Rx


 


Penicillin Vk [Veetids TAB] 500 mg PO QID 7 Days #56 tablet 04/04/21 Unknown Rx











                                    Allergies











Allergy/AdvReac Type Severity Reaction Status Date / Time


 


ampicillin Allergy  Itching Verified 08/18/15 20:17














ED Review of Systems


ROS: 


Stated complaint: TOOTHACHE/MOUTH SWOLLEN/NAUSEA


Other details as noted in HPI





Comment: All other systems reviewed and negative





ED Past Medical Hx





- Past Medical History


Previous Medical History?: Yes


Hx CVA: Yes (2010- NO RESIDUAL)


Hx Congestive Heart Failure: No


Hx Diabetes: No


Hx Arthritis: Yes


Hx Headaches / Migraines: Yes


Hx Psychiatric Treatment: Yes (depression)


Hx Asthma: No


Hx COPD: No


Hx HIV: No





- Surgical History


Past Surgical History?: Yes


Hx Appendectomy: Yes


Additional Surgical History: hysterectomy, left ankle surgery, left carotid 

endarterectomy





- Social History


Smoking Status: Never Smoker


Substance Use Type: None





- Medications


Home Medications: 


                                Home Medications











 Medication  Instructions  Recorded  Confirmed  Last Taken  Type


 


Aspirin EC [Halfprin EC] 81 mg PO QDAY 06/09/15 11/18/15 Unknown History


 


Acetaminophen/Codeine [Tylenol #3] 1 tab PO Q6H PRN #20 tab 02/22/16  Unknown Rx


 


Cyclobenzaprine [Flexeril] 10 mg PO TID PRN #20 tablet 02/22/16  Unknown Rx


 


Naproxen [Naprosyn TAB] 500 mg PO BID #30 tablet 02/22/16  Unknown Rx


 


Azithromycin [Zithromax Z-MODE] 250 mg PO QDAY #6 tablet 06/14/16  Unknown Rx


 


Ibuprofen [Motrin] 800 mg PO Q8HR PRN #10 tablet 06/14/16  Unknown Rx


 


Promethazine /Codeine 5 ml PO Q6H PRN #90 ml 06/14/16  Unknown Rx





[Phenergan/Codeine 6.25-10 mg/5Ml]     


 


Cyclobenzaprine HCl [Flexeril 5 MG 5 mg PO TID #20 tab 08/26/16  Unknown Rx





TAB]     


 


Ibuprofen [Motrin 800 MG tab] 800 mg PO Q8HR PRN #20 tablet 08/26/16  Unknown Rx


 


Albuterol Mdi (or & Nicu Only) 2 puff IH QID PRN #1 inhalation 11/25/17  Unknown

Rx





[Proair]     


 


levoFLOXacin [Levaquin] 750 mg PO QDAY #7 tablet 11/25/17  Unknown Rx


 


Nitrofurantoin Monohyd/M-Cryst 100 mg PO Q12H 3 Days #6 capsule 12/10/17  

Unknown Rx





[Macrobid 100 mg Capsule]     


 


Azithromycin [Zithromax Z-MODE] 250 mg PO DAILY #6 tablet 08/22/18  Unknown Rx


 


Benzonatate [Tessalon Perle] 100 mg PO Q8H PRN #20 capsule 08/22/18  Unknown Rx


 


Loratadine (Nf) [Claritin] 10 mg PO DAILY #30 tablet 08/22/18  Unknown Rx


 


Ibuprofen [Motrin] 600 mg PO Q8H PRN #20 tablet 11/12/18  Unknown Rx


 


traMADoL [Ultram 50 MG tab] 50 mg PO Q6HR PRN 3 Days #12 tablet 02/26/19  

Unknown Rx


 


Bepotastine Besilate [Bepreve 1.5%] 1 drop OU BID #1 drops 04/09/19  Unknown Rx


 


Cetirizine HCl [ZyrTEC] 10 mg PO DAILY #30 tab.rapdis 04/09/19  Unknown Rx


 


Fluticasone [Flonase] 1 spray NS QDAY #1 bottle 04/09/19  Unknown Rx


 


Naproxen [Naprosyn] 500 mg PO BID #20 tablet 06/17/19  Unknown Rx


 


methOCARBAMOL [Robaxin TAB] 500 mg PO Q8HR PRN #20 tablet 06/17/19  Unknown Rx


 


predniSONE [Deltasone] 50 mg PO QDAY #5 tab 06/17/19  Unknown Rx


 


traMADoL [Ultram] 50 mg PO Q6HR PRN #7 tablet 06/17/19  Unknown Rx


 


Ketorolac [Toradol] 10 mg PO Q6H PRN #15 tablet 09/22/19  Unknown Rx


 


methOCARBAMOL [Robaxin] 750 mg PO Q8H PRN #21 tablet 09/22/19  Unknown Rx


 


predniSONE [Deltasone] 50 mg PO QDAY #5 tab 09/22/19  Unknown Rx


 


Doxycycline Hyclate [Doxycycline 100 mg PO BID 7 Days #14 tab 10/27/19  Unknown 

Rx





Hyclate TAB]     


 


Mupirocin [Bactroban 2% OINT] 1 applic TP TID #1 tube 10/27/19  Unknown Rx


 


Famotidine [Pepcid] 20 mg PO BID 5 Days #10 tablet 01/19/20  Unknown Rx


 


Hydroxyzine HCl [hydrOXYzine] 50 mg PO Q6H PRN #24 tablet 01/19/20  Unknown Rx


 


Triamcinolone 0.1% [Kenalog 0.1% 1 applic TP BID 10 Days #1 tube 01/19/20  

Unknown Rx





CREAM]     


 


predniSONE [Deltasone] 40 mg PO QDAY 5 Days #10 tab 01/19/20  Unknown Rx


 


Chlorhexidine Mouthwash [Peridex] 15 ml MM BID #1 bottle 04/04/21  Unknown Rx


 


Naproxen 375 mg PO BID PRN #14 tablet. 04/04/21  Unknown Rx


 


Penicillin Vk [Veetids TAB] 500 mg PO QID 7 Days #56 tablet 04/04/21  Unknown Rx














ED Physical Exam





- General


Limitations: No Limitations


General appearance: alert, in no apparent distress





- Head


Head exam: Present: atraumatic, normocephalic





- Eye


Eye exam: Present: normal appearance





- ENT


ENT exam: Present: normal orophraynx (no tongue elevation, no muffled voice, no 

submandibular edema), mucous membranes moist, other (multiple missing teeth from

prior extractions, there is a dental carry with a broken tooth present to the 

left lower gumline at the cuspid, there is induration of the adjacent gumline 

and small amount of left lower jaw edema, uvula is midline, no uvular edema or 

deviation, no trismus)





- Respiratory


Respiratory exam: Absent: respiratory distress, accessory muscle use





- Neurological Exam


Neurological exam: Present: alert, oriented X3





- Psychiatric


Psychiatric exam: Present: normal affect, normal mood





- Skin


Skin exam: Present: warm, dry, intact





ED Course


                                   Vital Signs











  04/04/21





  02:14


 


Temperature 98.2 F


 


Pulse Rate 98 H


 


Respiratory 18





Rate 


 


Blood Pressure 163/100


 


O2 Sat by Pulse 98





Oximetry 














ED Medical Decision Making





- Medical Decision Making





Patient is a 61-year-old female presents emergency room complaints of left lower

dental pain that began a few days ago.  She states that she has a broken tooth 

present in that region.  She states that she has noticed some left lower jaw 

swelling.  She has associated nausea.  She states that she last saw a dentist 

approximately a year ago.  She states that she needs to have a tooth extraction 

performed.  She denies any fever, vomiting, difficulty swallowing, difficulty 

breathing.  Past medical history of arthritis, CVA, migraines, depression.  

Allergy to ampicillin, she states that she can take penicillin with no issues.  

Vitals are stable.  On exam: multiple missing teeth from prior extractions, 

there is a dental carry with a broken tooth present to the left lower gumline at

the cuspid, there is induration of the adjacent gumline and small amount of left

lower jaw edema, uvula is midline, no uvular edema or deviation, no trismus, no 

tongue elevation, no muffled voice, no submandibular edema.  Examination appears

consistent with dental carry with associated dental abscess, no signs of facial 

cellulitis, facial abscess, Shine's at this time.  Patient given prescription 

for penicillin VK, chlorhexidine mouthwash, naproxen.  Advised patient Please 

take medication as prescribed.  Increase your water intake.  Gargle with warm 

salt water.  Follow-up with the dentist.  It is very important that you follow-

up.  Return to emergency room for any new or worsening symptoms.


Critical care attestation.: 


If time is entered above; I have spent that time in minutes in the direct care 

of this critically ill patient, excluding procedure time.








ED Disposition


Clinical Impression: 


 Dental caries, Dental abscess





Disposition: DC-01 TO HOME OR SELFCARE


Is pt being admited?: No


Does the pt Need Aspirin: No


Condition: Stable


Instructions:  Dental Abscess


Additional Instructions: 


Please take medication as prescribed.  Increase your water intake.  Gargle with 

warm salt water.  Follow-up with the dentist.  It is very important that you 

follow-up.  Return to emergency room for any new or worsening symptoms.


Prescriptions: 


Naproxen 375 mg PO BID PRN #14 tablet.dr


 PRN Reason: pain


Chlorhexidine Mouthwash [Peridex] 15 ml MM BID #1 bottle


Penicillin Vk [Veetids TAB] 500 mg PO QID 7 Days #56 tablet


Referrals: 


your, dentist [Other] - 2-3 Days


Time of Disposition: 02:46


Print Language: ENGLISH

## 2022-06-17 ENCOUNTER — HOSPITAL ENCOUNTER (EMERGENCY)
Dept: HOSPITAL 5 - ED | Age: 62
LOS: 1 days | Discharge: STILL ADMITTED OR ADMINISTRATIVE | End: 2022-06-18
Payer: SELF-PAY

## 2022-06-17 DIAGNOSIS — Z79.899: ICD-10-CM

## 2022-06-17 DIAGNOSIS — M19.90: ICD-10-CM

## 2022-06-17 DIAGNOSIS — G43.909: ICD-10-CM

## 2022-06-17 DIAGNOSIS — Z91.09: ICD-10-CM

## 2022-06-17 DIAGNOSIS — Z86.73: ICD-10-CM

## 2022-06-17 DIAGNOSIS — R45.84: Primary | ICD-10-CM

## 2022-06-17 DIAGNOSIS — F32.A: ICD-10-CM

## 2022-06-17 PROCEDURE — G0480 DRUG TEST DEF 1-7 CLASSES: HCPCS

## 2022-06-17 PROCEDURE — 80320 DRUG SCREEN QUANTALCOHOLS: CPT

## 2022-06-17 PROCEDURE — 36415 COLL VENOUS BLD VENIPUNCTURE: CPT

## 2022-06-17 PROCEDURE — 99283 EMERGENCY DEPT VISIT LOW MDM: CPT

## 2022-06-17 PROCEDURE — 80053 COMPREHEN METABOLIC PANEL: CPT

## 2022-06-17 PROCEDURE — 85025 COMPLETE CBC W/AUTO DIFF WBC: CPT

## 2022-06-18 VITALS — SYSTOLIC BLOOD PRESSURE: 121 MMHG | DIASTOLIC BLOOD PRESSURE: 92 MMHG

## 2022-06-18 LAB
ALBUMIN SERPL-MCNC: 4.3 G/DL (ref 3.9–5)
ALT SERPL-CCNC: 12 UNITS/L (ref 7–56)
BASOPHILS # (AUTO): 0.1 K/MM3 (ref 0–0.1)
BASOPHILS NFR BLD AUTO: 1.2 % (ref 0–1.8)
BUN SERPL-MCNC: 17 MG/DL (ref 7–17)
BUN/CREAT SERPL: 34 %
CALCIUM SERPL-MCNC: 8.9 MG/DL (ref 8.4–10.2)
EOSINOPHIL # BLD AUTO: 0.4 K/MM3 (ref 0–0.4)
EOSINOPHIL NFR BLD AUTO: 6 % (ref 0–4.3)
HCT VFR BLD CALC: 38.1 % (ref 30.3–42.9)
HEMOLYSIS INDEX: 3
HGB BLD-MCNC: 12.7 GM/DL (ref 10.1–14.3)
LYMPHOCYTES # BLD AUTO: 1.5 K/MM3 (ref 1.2–5.4)
LYMPHOCYTES NFR BLD AUTO: 24.6 % (ref 13.4–35)
MCHC RBC AUTO-ENTMCNC: 33 % (ref 30–34)
MCV RBC AUTO: 100 FL (ref 79–97)
MONOCYTES # (AUTO): 0.5 K/MM3 (ref 0–0.8)
MONOCYTES % (AUTO): 8.4 % (ref 0–7.3)
PLATELET # BLD: 310 K/MM3 (ref 140–440)
RBC # BLD AUTO: 3.83 M/MM3 (ref 3.65–5.03)

## 2022-06-18 NOTE — EVENT NOTE
Date: 06/18/22


Patient has been cleared by psych she is having no homicidal or suicidal 

ideation.  She has goal-directed behavior I will discharge patient

## 2022-06-18 NOTE — CONSULTATION
History of Present Illness





- Reason for Consult


Consult date: 22


Reason for consult: Depression, stress





- History of Present Psychiatric Illness


HPI: patient presents with complaints of anhedonia and depressed mood x 4 

months. states they started after they lost 2 people in their family within 2 

weeks of each other. Endorses visual and auditory hallucinations. Denies 

suicidal and homicidal ideations, intent or plan. Patient is 6/8 on SIGECAPS and

only negative for suicidality and psychomotor retardation. 





The patient was seen today. She is conversational. She is crying. The patient 

says she's been dealing with depression since her long-term boyfriend  of 

COVID in January of this month. She says he was also the father of her son. She 

says she had to take care of him while he was sick. The patient also says the 

boyfriend's sister  two weeks prior to his death of COVID as well. She says 

she's been dealing with a lot of stress since then. The patient says now, the 

boyfriend's family wants her and her son to leave the house. Although she is 

tearful, she says she is optimistic and can't go down without a fight. She says 

"I'm going to show them I'm the bigger person." The patient says "my son and I 

really don't want to live there anyway because it needs a lot of work and we are

tired of the dark paneled walls." The patient says she works and has a strong 

[employer] family. She says she really just needed someone to talk to and a good 

night's sleep because she's been holding things in so long and it gets the best 

of her sometimes. She says she has a history of depression and bipolar, but 

doesn't take any medications. The patient denies feeling SI/HI. She says "no, I 

don't feel like doing anything like that at all. I have my son, and we are going

to come out of this thing on top." She denies hallucinations of any kind. The 

patient denies any illicit drug use. 





PAST PSYCHIATRIC HISTORY:


Diagnoses: depression


Suicide attempts or Self-harm behavior: Denies


Prior psychiatric hospitalizations: Denies


Substance Abuse history: Denies


Previous psychiatric medications tried: Denies


Outpatient treatment: Denies





PAST MEDICAL HISTORY: none reported





Family Psychiatric History: None reported or documented





SOCIAL HISTORY


Marital Status: Single


Living Arrangements: with son


Employment Status: Employed


Access to guns/weapons: Denies


Education: 


History of Abuse: Denies


Legal History: Denies





REVIEW OF SYSTEMS


Constitutional: Negative for weight loss


ENT: Negative for stridor


Respiratory: Negative for cough or hemoptysis


All other systems reviewed and are negative


 


MENTAL STATUS EXAMINATION


General Appearance and Behavior: Age appropriate, wearing appropriate clothes, c

ooperative, polite with questioning, good eye contact


Cooperation: cooperative, irritable, guarded


Psychomotor Behavior: Psychomotor normal


Mood: Depressed


Affect and affective range: congruent with stated mood, tearful


Thought Process: Goal directed


Thought Content: Reality oriented


Speech: Normal volume, Regular rate and rhythm 


Suicidal Ideation: Denies


Homicidal Ideation: Denies


Hallucination: Denies


Delusions: None elicited


Impulse Control: Limited


Insight and Judgment: Limited


Memory: limited


Attention: attentive


Orientation: Alert and oriented





Diagnoses: 


Major Depressive Disorder





Treatment Plan


Vistaril 25mg po BID prn anxiety


Lexapro 10mg po daily


Trazodone 50mg po qhs


Sitter: per primary


Medical: Per primary


Disposition: Do not Recommend acute psychiatric inpatient treatment


The  to give the patient all necessary outpatient resources including 

therapy


Will sign off. Thanks


Case staffed with Dr. Boston











Medications and Allergies


                                    Allergies











Allergy/AdvReac Type Severity Reaction Status Date / Time


 


ampicillin Allergy  Itching Verified 08/18/15 20:17











                                Home Medications











 Medication  Instructions  Recorded  Confirmed  Last Taken  Type


 


Aspirin EC [Halfprin EC] 81 mg PO QDAY 06/09/15 11/18/15 Unknown History


 


Acetaminophen/Codeine [Tylenol #3] 1 tab PO Q6H PRN #20 tab 16  Unknown Rx


 


Cyclobenzaprine [Flexeril] 10 mg PO TID PRN #20 tablet 16  Unknown Rx


 


Naproxen [Naprosyn TAB] 500 mg PO BID #30 tablet 16  Unknown Rx


 


Azithromycin [Zithromax Z-MODE] 250 mg PO QDAY #6 tablet 16  Unknown Rx


 


Ibuprofen [Motrin] 800 mg PO Q8HR PRN #10 tablet 16  Unknown Rx


 


Promethazine /Codeine 5 ml PO Q6H PRN #90 ml 16  Unknown Rx





[Phenergan/Codeine 6.25-10 mg/5Ml]     


 


Cyclobenzaprine HCl [Flexeril 5 MG 5 mg PO TID #20 tab 16  Unknown Rx





TAB]     


 


Ibuprofen [Motrin 800 MG tab] 800 mg PO Q8HR PRN #20 tablet 16  Unknown Rx


 


Albuterol Mdi (or & Nicu Only) 2 puff IH QID PRN #1 inhalation 17  Unknown

Rx





[Proair]     


 


levoFLOXacin [Levaquin] 750 mg PO QDAY #7 tablet 17  Unknown Rx


 


Nitrofurantoin Monohyd/M-Cryst 100 mg PO Q12H 3 Days #6 capsule 12/10/17  

Unknown Rx





[Macrobid 100 mg Capsule]     


 


Azithromycin [Zithromax Z-MODE] 250 mg PO DAILY #6 tablet 18  Unknown Rx


 


Benzonatate [Tessalon Perle] 100 mg PO Q8H PRN #20 capsule 18  Unknown Rx


 


Loratadine (Nf) [Claritin] 10 mg PO DAILY #30 tablet 18  Unknown Rx


 


Ibuprofen [Motrin] 600 mg PO Q8H PRN #20 tablet 18  Unknown Rx


 


traMADoL [Ultram 50 MG tab] 50 mg PO Q6HR PRN 3 Days #12 tablet 19  

Unknown Rx


 


Bepotastine Besilate [Bepreve 1.5%] 1 drop OU BID #1 drops 19  Unknown Rx


 


Cetirizine HCl [ZyrTEC] 10 mg PO DAILY #30 tab.rapdis 19  Unknown Rx


 


Fluticasone [Flonase] 1 spray NS QDAY #1 bottle 19  Unknown Rx


 


Naproxen [Naprosyn] 500 mg PO BID #20 tablet 19  Unknown Rx


 


methOCARBAMOL [Robaxin TAB] 500 mg PO Q8HR PRN #20 tablet 19  Unknown Rx


 


predniSONE [Deltasone] 50 mg PO QDAY #5 tab 19  Unknown Rx


 


traMADoL [Ultram] 50 mg PO Q6HR PRN #7 tablet 19  Unknown Rx


 


Ketorolac [Toradol] 10 mg PO Q6H PRN #15 tablet 19  Unknown Rx


 


methOCARBAMOL [Robaxin] 750 mg PO Q8H PRN #21 tablet 19  Unknown Rx


 


predniSONE [Deltasone] 50 mg PO QDAY #5 tab 19  Unknown Rx


 


Doxycycline Hyclate [Doxycycline 100 mg PO BID 7 Days #14 tab 10/27/19  Unknown 

Rx





Hyclate TAB]     


 


Mupirocin [Bactroban 2% OINT] 1 applic TP TID #1 tube 10/27/19  Unknown Rx


 


Famotidine [Pepcid] 20 mg PO BID 5 Days #10 tablet 20  Unknown Rx


 


Hydroxyzine HCl [hydrOXYzine] 50 mg PO Q6H PRN #24 tablet 20  Unknown Rx


 


Triamcinolone 0.1% [Kenalog 0.1% 1 applic TP BID 10 Days #1 tube 20  

Unknown Rx





CREAM]     


 


predniSONE [Deltasone] 40 mg PO QDAY 5 Days #10 tab 20  Unknown Rx


 


Chlorhexidine Mouthwash [Peridex] 15 ml MM BID #1 bottle 21  Unknown Rx


 


Naproxen 375 mg PO BID PRN #14 tablet. 21  Unknown Rx


 


Penicillin Vk [Veetids TAB] 500 mg PO QID 7 Days #56 tablet 21  Unknown Rx


 


Escitalopram [Lexapro] 10 mg PO DAILY #30 tablet 22  Unknown Rx


 


hydrOXYzine PAMOATE [Vistaril] 25 mg PO BID PRN #60 capsule 22  Unknown Rx


 


traZODone [Desyrel] 50 mg PO QHS #30 tab 22  Unknown Rx














Mental Status Exam





- Vital signs


                                Last Vital Signs











Temp  98.5 F   22 10:47


 


Pulse  64   22 08:51


 


Resp  16   22 08:51


 


BP  121/92   22 08:51


 


Pulse Ox  94   22 08:51














Results


Result Diagrams: 


                                 22 09:11





                                 22 09:11


                              Abnormal lab results











  22 Range/Units





  09:11 09:11 09:11 


 


MCV  100 H    (79-97)  fl


 


MCH  33 H    (28-32)  pg


 


Mono % (Auto)  8.4 H    (0.0-7.3)  %


 


Eos % (Auto)  6.0 H    (0.0-4.3)  %


 


Chloride   107.1 H   ()  mmol/L


 


Creatinine   0.5 L   (0.6-1.2)  mg/dL


 


Acetaminophen    5.0 L  (10.0-30.0)  ug/mL








All other labs normal.

## 2022-06-18 NOTE — EMERGENCY DEPARTMENT REPORT
ED General Adult HPI





- General


Chief complaint: Psych


Stated complaint: EMOTIONAL BREAKDOWN


Time Seen by Provider: 06/18/22 08:52


Source: patient


Mode of arrival: Ambulatory


Limitations: No Limitations





- History of Present Illness


Initial comments: 





patient presents with complaints of anhedonia and depressed mood x 4 months. 

states they started after they lost 2 people in their family within 2 weeks of 

each other. Endorses visual and auditory hallucinations. Denies suicidal and 

homicidal ideations, intent or plan. Patient is 6/8 on SIGECAPS and only 

negative for suicidality and psychomotor retardation. 


Severity scale (0 -10): 0





- Related Data


                                Home Medications











 Medication  Instructions  Recorded  Confirmed  Last Taken


 


Aspirin EC [Halfprin EC] 81 mg PO QDAY 06/09/15 11/18/15 Unknown








                                  Previous Rx's











 Medication  Instructions  Recorded  Last Taken  Type


 


Acetaminophen/Codeine [Tylenol #3] 1 tab PO Q6H PRN #20 tab 02/22/16 Unknown Rx


 


Cyclobenzaprine [Flexeril] 10 mg PO TID PRN #20 tablet 02/22/16 Unknown Rx


 


Naproxen [Naprosyn TAB] 500 mg PO BID #30 tablet 02/22/16 Unknown Rx


 


Azithromycin [Zithromax Z-MODE] 250 mg PO QDAY #6 tablet 06/14/16 Unknown Rx


 


Ibuprofen [Motrin] 800 mg PO Q8HR PRN #10 tablet 06/14/16 Unknown Rx


 


Promethazine /Codeine 5 ml PO Q6H PRN #90 ml 06/14/16 Unknown Rx





[Phenergan/Codeine 6.25-10 mg/5Ml]    


 


Cyclobenzaprine HCl [Flexeril 5 MG 5 mg PO TID #20 tab 08/26/16 Unknown Rx





TAB]    


 


Ibuprofen [Motrin 800 MG tab] 800 mg PO Q8HR PRN #20 tablet 08/26/16 Unknown Rx


 


Albuterol Mdi (or & Nicu Only) 2 puff IH QID PRN #1 inhalation 11/25/17 Unknown 

Rx





[Proair]    


 


levoFLOXacin [Levaquin] 750 mg PO QDAY #7 tablet 11/25/17 Unknown Rx


 


Nitrofurantoin Monohyd/M-Cryst 100 mg PO Q12H 3 Days #6 capsule 12/10/17 Unknown

 Rx





[Macrobid 100 mg Capsule]    


 


Azithromycin [Zithromax Z-MODE] 250 mg PO DAILY #6 tablet 08/22/18 Unknown Rx


 


Benzonatate [Tessalon Perle] 100 mg PO Q8H PRN #20 capsule 08/22/18 Unknown Rx


 


Loratadine (Nf) [Claritin] 10 mg PO DAILY #30 tablet 08/22/18 Unknown Rx


 


Ibuprofen [Motrin] 600 mg PO Q8H PRN #20 tablet 11/12/18 Unknown Rx


 


traMADoL [Ultram 50 MG tab] 50 mg PO Q6HR PRN 3 Days #12 tablet 02/26/19 Unknown

Rx


 


Bepotastine Besilate [Bepreve 1.5%] 1 drop OU BID #1 drops 04/09/19 Unknown Rx


 


Cetirizine HCl [ZyrTEC] 10 mg PO DAILY #30 tab.rapdis 04/09/19 Unknown Rx


 


Fluticasone [Flonase] 1 spray NS QDAY #1 bottle 04/09/19 Unknown Rx


 


Naproxen [Naprosyn] 500 mg PO BID #20 tablet 06/17/19 Unknown Rx


 


methOCARBAMOL [Robaxin TAB] 500 mg PO Q8HR PRN #20 tablet 06/17/19 Unknown Rx


 


predniSONE [Deltasone] 50 mg PO QDAY #5 tab 06/17/19 Unknown Rx


 


traMADoL [Ultram] 50 mg PO Q6HR PRN #7 tablet 06/17/19 Unknown Rx


 


Ketorolac [Toradol] 10 mg PO Q6H PRN #15 tablet 09/22/19 Unknown Rx


 


methOCARBAMOL [Robaxin] 750 mg PO Q8H PRN #21 tablet 09/22/19 Unknown Rx


 


predniSONE [Deltasone] 50 mg PO QDAY #5 tab 09/22/19 Unknown Rx


 


Doxycycline Hyclate [Doxycycline 100 mg PO BID 7 Days #14 tab 10/27/19 Unknown 

Rx





Hyclate TAB]    


 


Mupirocin [Bactroban 2% OINT] 1 applic TP TID #1 tube 10/27/19 Unknown Rx


 


Famotidine [Pepcid] 20 mg PO BID 5 Days #10 tablet 01/19/20 Unknown Rx


 


Hydroxyzine HCl [hydrOXYzine] 50 mg PO Q6H PRN #24 tablet 01/19/20 Unknown Rx


 


Triamcinolone 0.1% [Kenalog 0.1% 1 applic TP BID 10 Days #1 tube 01/19/20 

Unknown Rx





CREAM]    


 


predniSONE [Deltasone] 40 mg PO QDAY 5 Days #10 tab 01/19/20 Unknown Rx


 


Chlorhexidine Mouthwash [Peridex] 15 ml MM BID #1 bottle 04/04/21 Unknown Rx


 


Naproxen 375 mg PO BID PRN #14 tablet. 04/04/21 Unknown Rx


 


Penicillin Vk [Veetids TAB] 500 mg PO QID 7 Days #56 tablet 04/04/21 Unknown Rx


 


Escitalopram [Lexapro] 10 mg PO DAILY #30 tablet 06/18/22 Unknown Rx


 


hydrOXYzine PAMOATE [Vistaril] 25 mg PO BID PRN #60 capsule 06/18/22 Unknown Rx


 


traZODone [Desyrel] 50 mg PO QHS #30 tab 06/18/22 Unknown Rx











                                    Allergies











Allergy/AdvReac Type Severity Reaction Status Date / Time


 


ampicillin Allergy  Itching Verified 08/18/15 20:17














ED Review of Systems


ROS: 


Stated complaint: EMOTIONAL BREAKDOWN


Other details as noted in HPI





Comment: All other systems reviewed and negative


Constitutional: denies: chills, fever





ED Past Medical Hx





- Past Medical History


Hx CVA: Yes (2010- NO RESIDUAL)


Hx Congestive Heart Failure: No


Hx Diabetes: No


Hx Arthritis: Yes


Hx Headaches / Migraines: Yes


Hx Psychiatric Treatment: Yes (depression)


Hx Asthma: No


Hx COPD: No


Hx HIV: No





- Surgical History


Hx Appendectomy: Yes


Additional Surgical History: hysterectomy, left ankle surgery, left carotid 

endarterectomy





- Social History


Smoking Status: Never Smoker


Substance Use Type: None





- Medications


Home Medications: 


                                Home Medications











 Medication  Instructions  Recorded  Confirmed  Last Taken  Type


 


Aspirin EC [Halfprin EC] 81 mg PO QDAY 06/09/15 11/18/15 Unknown History


 


Acetaminophen/Codeine [Tylenol #3] 1 tab PO Q6H PRN #20 tab 02/22/16  Unknown Rx


 


Cyclobenzaprine [Flexeril] 10 mg PO TID PRN #20 tablet 02/22/16  Unknown Rx


 


Naproxen [Naprosyn TAB] 500 mg PO BID #30 tablet 02/22/16  Unknown Rx


 


Azithromycin [Zithromax Z-MODE] 250 mg PO QDAY #6 tablet 06/14/16  Unknown Rx


 


Ibuprofen [Motrin] 800 mg PO Q8HR PRN #10 tablet 06/14/16  Unknown Rx


 


Promethazine /Codeine 5 ml PO Q6H PRN #90 ml 06/14/16  Unknown Rx





[Phenergan/Codeine 6.25-10 mg/5Ml]     


 


Cyclobenzaprine HCl [Flexeril 5 MG 5 mg PO TID #20 tab 08/26/16  Unknown Rx





TAB]     


 


Ibuprofen [Motrin 800 MG tab] 800 mg PO Q8HR PRN #20 tablet 08/26/16  Unknown Rx


 


Albuterol Mdi (or & Nicu Only) 2 puff IH QID PRN #1 inhalation 11/25/17  Unknown

Rx





[Proair]     


 


levoFLOXacin [Levaquin] 750 mg PO QDAY #7 tablet 11/25/17  Unknown Rx


 


Nitrofurantoin Monohyd/M-Cryst 100 mg PO Q12H 3 Days #6 capsule 12/10/17  

Unknown Rx





[Macrobid 100 mg Capsule]     


 


Azithromycin [Zithromax Z-MODE] 250 mg PO DAILY #6 tablet 08/22/18  Unknown Rx


 


Benzonatate [Tessalon Perle] 100 mg PO Q8H PRN #20 capsule 08/22/18  Unknown Rx


 


Loratadine (Nf) [Claritin] 10 mg PO DAILY #30 tablet 08/22/18  Unknown Rx


 


Ibuprofen [Motrin] 600 mg PO Q8H PRN #20 tablet 11/12/18  Unknown Rx


 


traMADoL [Ultram 50 MG tab] 50 mg PO Q6HR PRN 3 Days #12 tablet 02/26/19  

Unknown Rx


 


Bepotastine Besilate [Bepreve 1.5%] 1 drop OU BID #1 drops 04/09/19  Unknown Rx


 


Cetirizine HCl [ZyrTEC] 10 mg PO DAILY #30 tab.rapdis 04/09/19  Unknown Rx


 


Fluticasone [Flonase] 1 spray NS QDAY #1 bottle 04/09/19  Unknown Rx


 


Naproxen [Naprosyn] 500 mg PO BID #20 tablet 06/17/19  Unknown Rx


 


methOCARBAMOL [Robaxin TAB] 500 mg PO Q8HR PRN #20 tablet 06/17/19  Unknown Rx


 


predniSONE [Deltasone] 50 mg PO QDAY #5 tab 06/17/19  Unknown Rx


 


traMADoL [Ultram] 50 mg PO Q6HR PRN #7 tablet 06/17/19  Unknown Rx


 


Ketorolac [Toradol] 10 mg PO Q6H PRN #15 tablet 09/22/19  Unknown Rx


 


methOCARBAMOL [Robaxin] 750 mg PO Q8H PRN #21 tablet 09/22/19  Unknown Rx


 


predniSONE [Deltasone] 50 mg PO QDAY #5 tab 09/22/19  Unknown Rx


 


Doxycycline Hyclate [Doxycycline 100 mg PO BID 7 Days #14 tab 10/27/19  Unknown 

Rx





Hyclate TAB]     


 


Mupirocin [Bactroban 2% OINT] 1 applic TP TID #1 tube 10/27/19  Unknown Rx


 


Famotidine [Pepcid] 20 mg PO BID 5 Days #10 tablet 01/19/20  Unknown Rx


 


Hydroxyzine HCl [hydrOXYzine] 50 mg PO Q6H PRN #24 tablet 01/19/20  Unknown Rx


 


Triamcinolone 0.1% [Kenalog 0.1% 1 applic TP BID 10 Days #1 tube 01/19/20  

Unknown Rx





CREAM]     


 


predniSONE [Deltasone] 40 mg PO QDAY 5 Days #10 tab 01/19/20  Unknown Rx


 


Chlorhexidine Mouthwash [Peridex] 15 ml MM BID #1 bottle 04/04/21  Unknown Rx


 


Naproxen 375 mg PO BID PRN #14 tablet.dr 04/04/21  Unknown Rx


 


Penicillin Vk [Veetids TAB] 500 mg PO QID 7 Days #56 tablet 04/04/21  Unknown Rx


 


Escitalopram [Lexapro] 10 mg PO DAILY #30 tablet 06/18/22  Unknown Rx


 


hydrOXYzine PAMOATE [Vistaril] 25 mg PO BID PRN #60 capsule 06/18/22  Unknown Rx


 


traZODone [Desyrel] 50 mg PO QHS #30 tab 06/18/22  Unknown Rx














ED Physical Exam





- General


Limitations: No Limitations


General appearance: alert, in no apparent distress





- Head


Head exam: Present: atraumatic, normocephalic





- Eye


Eye exam: Present: PERRL, EOMI





- ENT


ENT exam: Present: mucous membranes moist, other (airway patent)





- Neck


Neck exam: Present: other (supple; no JVD)





- Respiratory


Respiratory exam: Present: other (good air entry, nml I:E, CTAB, no use of KANIKA)





- Cardiovascular


Cardiovascular Exam: Present: regular rate.  Absent: rubs, gallop





- GI/Abdominal


GI/Abdominal exam: Present: soft, normal bowel sounds.  Absent: distended, 

tenderness, guarding





- Extremities Exam


Extremities exam: Present: full ROM.  Absent: tenderness





- Back Exam


Back exam: Present: full ROM.  Absent: tenderness





- Neurological Exam


Neurological exam: Present: alert, oriented X3, CN II-XII intact.  Absent: motor

 sensory deficit





- Psychiatric


Psychiatric exam: Present: other (labile affect; no SI or HI; endorses visual 

and auditory hallucinations)





- Skin


Skin exam: Present: warm, normal color





ED Course


                                   Vital Signs











  06/17/22 06/18/22





  10:47 08:51


 


Temperature 98.5 F 


 


Pulse Rate 98 H 64


 


Respiratory 16 16





Rate  


 


Blood Pressure 128/85 121/92





[Left]  


 


O2 Sat by Pulse 96 94





Oximetry  














ED Medical Decision Making





- Lab Data


Result diagrams: 


                                 06/18/22 09:11





                                 06/18/22 09:11








                                Laboratory Tests











  06/18/22 06/18/22 06/18/22





  09:11 09:11 09:11


 


WBC  6.1  


 


RBC  3.83  


 


Hgb  12.7  


 


Hct  38.1  


 


MCV  100 H  


 


MCH  33 H  


 


MCHC  33  


 


RDW  14.4  


 


Plt Count  310  


 


Lymph % (Auto)  24.6  


 


Mono % (Auto)  8.4 H  


 


Eos % (Auto)  6.0 H  


 


Baso % (Auto)  1.2  


 


Lymph # (Auto)  1.5  


 


Mono # (Auto)  0.5  


 


Eos # (Auto)  0.4  


 


Baso # (Auto)  0.1  


 


Seg Neutrophils %  59.8  


 


Seg Neutrophils #  3.6  


 


Sodium   139 


 


Potassium   4.5 


 


Chloride   107.1 H 


 


Carbon Dioxide   25 


 


Anion Gap   11 


 


BUN   17 


 


Creatinine   0.5 L 


 


Estimated GFR   > 60 


 


BUN/Creatinine Ratio   34 


 


Glucose   100 


 


Calcium   8.9 


 


Total Bilirubin   < 0.20 


 


AST   19 


 


ALT   12 


 


Alkaline Phosphatase   70 


 


Total Protein   7.0 


 


Albumin   4.3 


 


Albumin/Globulin Ratio   1.6 


 


Salicylates    9.6


 


Acetaminophen   


 


Plasma/Serum Alcohol   














  06/18/22 06/18/22





  09:11 09:11


 


WBC  


 


RBC  


 


Hgb  


 


Hct  


 


MCV  


 


MCH  


 


MCHC  


 


RDW  


 


Plt Count  


 


Lymph % (Auto)  


 


Mono % (Auto)  


 


Eos % (Auto)  


 


Baso % (Auto)  


 


Lymph # (Auto)  


 


Mono # (Auto)  


 


Eos # (Auto)  


 


Baso # (Auto)  


 


Seg Neutrophils %  


 


Seg Neutrophils #  


 


Sodium  


 


Potassium  


 


Chloride  


 


Carbon Dioxide  


 


Anion Gap  


 


BUN  


 


Creatinine  


 


Estimated GFR  


 


BUN/Creatinine Ratio  


 


Glucose  


 


Calcium  


 


Total Bilirubin  


 


AST  


 


ALT  


 


Alkaline Phosphatase  


 


Total Protein  


 


Albumin  


 


Albumin/Globulin Ratio  


 


Salicylates  


 


Acetaminophen  5.0 L 


 


Plasma/Serum Alcohol   < 0.01














- Medical Decision Making





Likely 2/2 abnormal grief/bereavement reaction. Patient medically cleared. 











Will not sign 1013 @ this time untile after mental eval by psych. MH consulted. 

Will go with their recommendations.


Critical care attestation.: 


If time is entered above; I have spent that time in minutes in the direct care 

of this critically ill patient, excluding procedure time.








ED Disposition


Clinical Impression: 


 Anhedonia





Disposition: 30 STILL A PATIENT


Is pt being admited?: No


Does the pt Need Aspirin: No


Condition: Stable


Additional Instructions: 


Professional and Agency Contacts To help Resolve Crises (24/7)


[place] Crisis Line: 1-884.658.2177


Suicide Prevention Line: 1-382.831.2660


Crisis Text Line: Text START to 883477


Emergency: 911





Outpatient [facility] Behavioral Health Resources:


[place]:


[facility]


[address], [place] [address]


Phone: [phone] 





[place]: [facility]


[address] [place] [address] 


Phone: [phone]


Monday thru Friday - 8am - 5pm


**Call to schedule an assessment for mental health and substance abuse 

programs***





[place]:


[facility]


Address: [address] [place] [address]


Monday thru Friday- 7am-2pm


Phone: [phone]





[facility]


Address: [address] [place] [address]


Monday thru Friday: 8:30AM-5PM


Phone: [phone]














Prescriptions: 


traZODone [Desyrel] 50 mg PO QHS #30 tab


Escitalopram [Lexapro] 10 mg PO DAILY #30 tablet


hydrOXYzine PAMOATE [Vistaril] 25 mg PO BID PRN #60 capsule


 PRN Reason: Anxiety


Time of Disposition: 10:55 (Patient care transferred to the next ER doctor 

pending sch evaluation. )

## 2022-07-06 ENCOUNTER — HOSPITAL ENCOUNTER (EMERGENCY)
Dept: HOSPITAL 5 - ED | Age: 62
Discharge: HOME | End: 2022-07-06
Payer: SELF-PAY

## 2022-07-06 VITALS — SYSTOLIC BLOOD PRESSURE: 142 MMHG | DIASTOLIC BLOOD PRESSURE: 81 MMHG

## 2022-07-06 DIAGNOSIS — G89.4: ICD-10-CM

## 2022-07-06 DIAGNOSIS — Z88.5: ICD-10-CM

## 2022-07-06 DIAGNOSIS — F41.9: ICD-10-CM

## 2022-07-06 DIAGNOSIS — M62.830: Primary | ICD-10-CM

## 2022-07-06 DIAGNOSIS — Z88.0: ICD-10-CM

## 2022-07-06 DIAGNOSIS — F32.9: ICD-10-CM

## 2022-07-06 DIAGNOSIS — Z98.890: ICD-10-CM

## 2022-07-06 DIAGNOSIS — Z86.73: ICD-10-CM

## 2022-07-06 DIAGNOSIS — G43.909: ICD-10-CM

## 2022-07-06 DIAGNOSIS — Z90.710: ICD-10-CM

## 2022-07-06 DIAGNOSIS — M19.90: ICD-10-CM

## 2022-07-06 LAB
ALBUMIN SERPL-MCNC: 4.6 G/DL (ref 3.9–5)
ALT SERPL-CCNC: 10 UNITS/L (ref 7–56)
BACTERIA #/AREA URNS HPF: (no result) /HPF
BASOPHILS # (AUTO): 0.1 K/MM3 (ref 0–0.1)
BASOPHILS NFR BLD AUTO: 1.1 % (ref 0–1.8)
BUN SERPL-MCNC: 12 MG/DL (ref 7–17)
BUN/CREAT SERPL: 24 %
CALCIUM SERPL-MCNC: 9.6 MG/DL (ref 8.4–10.2)
EOSINOPHIL # BLD AUTO: 0.1 K/MM3 (ref 0–0.4)
EOSINOPHIL NFR BLD AUTO: 1.7 % (ref 0–4.3)
HCT VFR BLD CALC: 39.4 % (ref 30.3–42.9)
HEMOLYSIS INDEX: 5
HGB BLD-MCNC: 13.3 GM/DL (ref 10.1–14.3)
LYMPHOCYTES # BLD AUTO: 2.2 K/MM3 (ref 1.2–5.4)
LYMPHOCYTES NFR BLD AUTO: 25.7 % (ref 13.4–35)
MCHC RBC AUTO-ENTMCNC: 34 % (ref 30–34)
MCV RBC AUTO: 97 FL (ref 79–97)
MONOCYTES # (AUTO): 0.6 K/MM3 (ref 0–0.8)
MONOCYTES % (AUTO): 6.9 % (ref 0–7.3)
MUCOUS THREADS #/AREA URNS HPF: (no result) /HPF
PH UR STRIP: 6 [PH] (ref 5–7)
PLATELET # BLD: 545 K/MM3 (ref 140–440)
PROT UR STRIP-MCNC: (no result) MG/DL
RBC # BLD AUTO: 4.06 M/MM3 (ref 3.65–5.03)
RBC #/AREA URNS HPF: 3 /HPF (ref 0–6)
UROBILINOGEN UR-MCNC: < 2 MG/DL (ref ?–2)
WBC #/AREA URNS HPF: 5 /HPF (ref 0–6)

## 2022-07-06 PROCEDURE — 81001 URINALYSIS AUTO W/SCOPE: CPT

## 2022-07-06 PROCEDURE — 36415 COLL VENOUS BLD VENIPUNCTURE: CPT

## 2022-07-06 PROCEDURE — 71046 X-RAY EXAM CHEST 2 VIEWS: CPT

## 2022-07-06 PROCEDURE — 80307 DRUG TEST PRSMV CHEM ANLYZR: CPT

## 2022-07-06 PROCEDURE — 85025 COMPLETE CBC W/AUTO DIFF WBC: CPT

## 2022-07-06 PROCEDURE — 80320 DRUG SCREEN QUANTALCOHOLS: CPT

## 2022-07-06 PROCEDURE — 99284 EMERGENCY DEPT VISIT MOD MDM: CPT

## 2022-07-06 PROCEDURE — 84484 ASSAY OF TROPONIN QUANT: CPT

## 2022-07-06 PROCEDURE — 80053 COMPREHEN METABOLIC PANEL: CPT

## 2022-07-06 PROCEDURE — G0480 DRUG TEST DEF 1-7 CLASSES: HCPCS

## 2022-07-06 NOTE — EVENT NOTE
ED Screening Note


ED Screening Note: 





SEVERE ANXIETY


HX CAD





CO CP





ARGUING WITH SON





COMES VIA EMS





POS AV JACOBSON


NO SI


NO HI





This initial assessment/diagnostic orders/clinical plan/treatment(s) is/are 

subject to change based on patients health status, clinical progression and re-

assessment by fellow clinical providers in the ED. Further treatment and workup 

at subsequent clinical providers discretion. Patient/guardian urged not to elope

from the ED as their condition may be serious if not clinically assessed and 

managed. 





Initial orders include: 


E

## 2022-07-06 NOTE — EMERGENCY DEPARTMENT REPORT
ED General Adult HPI





- General


Chief complaint: Anxiety


Stated complaint: ANXIETY


Source: patient


Mode of arrival: Ambulatory


Limitations: No Limitations





- History of Present Illness


Initial comments: 





Patient is a 62-year-old female with a history of anxiety, depression, migraine 

headaches, chronic osteoarthritis and chronic pain, and previous CVA who 

presents to the ED with complaint of acute exacerbation of her chronic pain 

characterized by bilateral shoulder pain, mid posterior thoracic pain that 

radiates to the chest diffusely for the last 2 weeks, worse in the last 1 week. 

Patient states that she has not been able to sleep because of worsening pain.  

Patient also states that she lost her  about 4 months ago and she has 

found it difficult to cope without him despite taking anxiety and depression 

medications.  Patient denies dizziness, syncope, shortness of breath, abdominal 

pain, nausea and vomiting, diarrhea, dysuria, urinary frequency and urgency, 

headache, numbness and tingling or weakness of upper and lower extremities 

bilaterally, cough or hemoptysis.  Patient also denies suicidal or homicidal 

ideations or hallucinations.


MD Complaint: Mid posterior thoracic pain, bilateral shoulder pain, and she has


-: week(s) (2)


Location: back, upper extremity (Bilateral shoulder pain)


Radiation: back (Diffuse mid posterior thoracic pain), extremity (Bilateral 

shoulder pain)


Severity scale (0 -10): 10


Quality: aching, sharp


Consistency: constant


Improves with: none


Worsens with: movement


Associated Symptoms: denies other symptoms, weakness.  denies: confusion, chest 

pain, cough, diaphoresis, fever/chills, headaches, malaise, nausea/vomiting, 

rash, shortness of breath, syncope


Treatments Prior to Arrival: none





- Related Data


                                Home Medications











 Medication  Instructions  Recorded  Confirmed  Last Taken


 


Aspirin EC [Halfprin EC] 81 mg PO QDAY 06/09/15 11/18/15 Unknown








                                  Previous Rx's











 Medication  Instructions  Recorded  Last Taken  Type


 


Bepotastine Besilate [Bepreve 1.5%] 1 drop OU BID #1 drops 19 Unknown Rx


 


Triamcinolone 0.1% [Kenalog 0.1% 1 applic TP BID 10 Days #1 tube 20 

Unknown Rx





CREAM]    


 


hydrOXYzine PAMOATE [Vistaril] 25 mg PO BID PRN #60 capsule 22 Unknown Rx


 


traZODone [Desyrel] 50 mg PO QHS #30 tab 22 Unknown Rx


 


Hydroxyzine HCl [hydrOXYzine] 50 mg PO Q6H PRN #30 tablet 22 Unknown Rx


 


Naproxen 375 mg PO Q12H PRN #20 tab 22 Unknown Rx


 


methOCARBAMOL [Robaxin TAB] 500 mg PO Q8HR PRN #30 tablet 22 Unknown Rx


 


predniSONE [Deltasone] 40 mg PO QDAY #10 tab 22 Unknown Rx











                                    Allergies











Allergy/AdvReac Type Severity Reaction Status Date / Time


 


ampicillin Allergy  Itching Verified 22 11:12


 


morphine AdvReac  Headache Verified 22 17:19














ED Review of Systems


ROS: 


Stated complaint: ANXIETY


Other details as noted in HPI





Constitutional: denies: chills, fever


Eyes: denies: eye pain, eye discharge, vision change


ENT: denies: ear pain, throat pain


Respiratory: denies: cough, shortness of breath, wheezing


Cardiovascular: denies: chest pain, palpitations


Endocrine: no symptoms reported


Gastrointestinal: denies: abdominal pain, nausea, vomiting, diarrhea


Genitourinary: denies: urgency, dysuria, discharge


Musculoskeletal: back pain (Mid posterior thoracic pain), arthralgia (Bilateral 

shoulder pain).  denies: joint swelling


Skin: denies: rash, lesions


Neurological: denies: headache, weakness, paresthesias


Psychiatric: denies: anxiety, depression


Hematological/Lymphatic: denies: easy bleeding, easy bruising





ED Past Medical Hx





- Past Medical History


Previous Medical History?: Yes


Hx CVA: Yes (- NO RESIDUAL)


Hx Congestive Heart Failure: No


Hx Diabetes: No


Hx Arthritis: Yes


Hx Headaches / Migraines: Yes


Hx Psychiatric Treatment: Yes (depression)


Hx Asthma: No


Hx COPD: No


Hx HIV: No





- Surgical History


Hx Appendectomy: Yes


Additional Surgical History: hysterectomy, left ankle surgery, left carotid 

endarterectomy





- Social History


Smoking Status: Current Every Day Smoker


Substance Use Type: None





- Medications


Home Medications: 


                                Home Medications











 Medication  Instructions  Recorded  Confirmed  Last Taken  Type


 


Aspirin EC [Halfprin EC] 81 mg PO QDAY 06/09/15 11/18/15 Unknown History


 


Bepotastine Besilate [Bepreve 1.5%] 1 drop OU BID #1 drops 19  Unknown Rx


 


Triamcinolone 0.1% [Kenalog 0.1% 1 applic TP BID 10 Days #1 tube 20  

Unknown Rx





CREAM]     


 


hydrOXYzine PAMOATE [Vistaril] 25 mg PO BID PRN #60 capsule 22  Unknown Rx


 


traZODone [Desyrel] 50 mg PO QHS #30 tab 22  Unknown Rx


 


Hydroxyzine HCl [hydrOXYzine] 50 mg PO Q6H PRN #30 tablet 22  Unknown Rx


 


Naproxen 375 mg PO Q12H PRN #20 tab 22  Unknown Rx


 


methOCARBAMOL [Robaxin TAB] 500 mg PO Q8HR PRN #30 tablet 22  Unknown Rx


 


predniSONE [Deltasone] 40 mg PO QDAY #10 tab 22  Unknown Rx














ED Physical Exam





- General


Limitations: No Limitations


General appearance: alert, in no apparent distress





- Head


Head exam: Present: atraumatic, normocephalic, normal inspection





- Eye


Eye exam: Present: normal appearance, PERRL, EOMI


Pupils: Present: normal accommodation





- ENT


ENT exam: Present: normal exam, normal orophraynx, mucous membranes moist, TM's 

normal bilaterally, normal external ear exam





- Neck


Neck exam: Present: normal inspection, full ROM.  Absent: tenderness





- Respiratory


Respiratory exam: Present: normal lung sounds bilaterally.  Absent: respiratory 

distress, wheezes, rales, rhonchi, chest wall tenderness, accessory muscle use, 

decreased breath sounds





- Cardiovascular


Cardiovascular Exam: Present: normal rhythm, tachycardia, normal heart sounds.  

Absent: systolic murmur, diastolic murmur, rubs, gallop





- GI/Abdominal


GI/Abdominal exam: Present: soft, normal bowel sounds.  Absent: tenderness, 

guarding, rebound, hyperactive bowel sounds, hypoactive bowel sounds, 

organomegaly





- Extremities Exam


Extremities exam: Present: normal inspection, full ROM, tenderness (Palpable 

bilateral shoulder tenderness), normal capillary refill.  Absent: pedal edema, 

joint swelling, calf tenderness





- Back Exam


Back exam: Present: normal inspection, full ROM, tenderness (Palpable mid 

posterior thoracic paraspinal musculoskeletal tenderness), muscle spasm, 

paraspinal tenderness.  Absent: CVA tenderness (R), CVA tenderness (L), 

vertebral tenderness





- Neurological Exam


Neurological exam: Present: alert, oriented X3, CN II-XII intact, normal gait, 

reflexes normal





- Psychiatric


Psychiatric exam: Present: anxious, flat affect.  Absent: agitated, homicidal 

ideation, suicidal ideation





- Skin


Skin exam: Present: warm, dry, intact, normal color.  Absent: rash





ED Course





                                   Vital Signs











  22





  11:06 17:12


 


Temperature 97.4 F L 98.0 F


 


Pulse Rate 108 H 87


 


Respiratory 18 16





Rate  


 


Blood Pressure  142/81


 


Blood Pressure 146/87 





[Left]  


 


O2 Sat by Pulse 97 97





Oximetry  














ED Medical Decision Making





- Lab Data


Result diagrams: 


                                 22 14:03





                                 22 14:03





- Radiology Data


Radiology results: report reviewed, image reviewed





[facility]  


                                     [address] [place] [address]  


 


                                            XRay Report   


                                               Signed  


 


Patient: ELBERT RAMOS                                                  

              MR#: M  


[id number]          


: 1960                                                                

Acct:[id number]      


 


Age/Sex: 62 / F                                                                

ADM Date: 22     


 


Loc: ED       


Attending Dr:   


 


 


Ordering Physician: KOFFI WATKINS  


Date of Service: 22  


Procedure(s): XR chest routine 2V  


Accession Number(s): [id number]  


 


cc: KOFFI WATKINS   


 


Fluoro Time In Minutes:   


 


CHEST 2 VIEWS   


 


 INDICATION / CLINICAL INFORMATION:  


 Chest Pain.  


 


 COMPARISON:   


 2018  


 


 FINDINGS:  


 


 SUPPORT DEVICES: None.  


 


 HEART / MEDIASTINUM: No significant abnormality.   


 


 LUNGS / PLEURA: The lungs are hyperinflated suggesting underlying emphysematous

 changes. No 


evidence for pneumonia, pleural effusion or pneumothorax.   


 


 ADDITIONAL FINDINGS: No significant additional findings.  


 


 IMPRESSION:  


 1. No acute findings. Hyperinflated lungs.  


 


 Signer Name: Mariusz Pete Jr, MD   


 Signed: 2022 2:39 PM  


 Workstation Name: [id number]   


 


 


Transcribed By: TTR  


Dictated By: MARIUSZ PETE JR, MD  


Electronically Authenticated By: MARIUSZ PETE JR, MD    


Signed Date/Time: 22                                


 


 


 


DD/DT: 22                                                            

  


TD/TT:








- Medical Decision Making





This is a 62-year-old female with a history of anxiety, depression, migraine 

headaches, chronic osteoarthritis and chronic pain, and previous CVA who 

presents to the ED with complaint of acute exacerbation of her chronic pain 

characterized by bilateral shoulder pain, mid posterior thoracic pain that 

radiates to the chest diffusely for the last 2 weeks, worse in the last 1 week. 

 Patient states that she has not been able to sleep because of worsening pain.  

Patient also states that she lost her  about 4 months ago and she has fou

nd it difficult to cope without him despite taking anxiety and depression 

medications.  In the ED, patient is alert and oriented x3 and is not in any 

distress.  Patient was treated for pain in the ED and also given medicine for 

anxiety.  Lab test results were reviewed and are all nonactionable.





- Differential Diagnosis


Muscle strain; osteoarthritis; anxiety; depression; muscle spasm


Critical care attestation.: 


If time is entered above; I have spent that time in minutes in the direct care 

of this critically ill patient, excluding procedure time.








ED Disposition


Clinical Impression: 


 Spasm of thoracic back muscle, Chronic osteoarthritis, Chronic pain syndrome, 

Anxiety with depression





Disposition: 01 HOME / SELF CARE / HOMELESS


Is pt being admited?: No


Does the pt Need Aspirin: No


Condition: Stable


Instructions:  Muscle Cramps and Spasms, Easy-to-Read, Arthritis, Easy-to-Read, 

Generalized Anxiety Disorder, Adult, Osteoarthritis


Additional Instructions: 


All lab test results were reviewed and are all nonactionable.  Chest x-ray 

showed no acute cardiopulmonary abnormalities or pneumonitis.  Therefore take 

medication with food, drink plenty of fluids and follow-up with your primary 

care physician in 5 to 7 days for reevaluation.  Return to the ED immediately if

 symptoms get worse.


Prescriptions: 


predniSONE [Deltasone] 40 mg PO QDAY #10 tab


Hydroxyzine HCl [hydrOXYzine] 50 mg PO Q6H PRN #30 tablet


 PRN Reason: Itching


Naproxen 375 mg PO Q12H PRN #20 tab


 PRN Reason: Pain , Severe (7-10)


methOCARBAMOL [Robaxin TAB] 500 mg PO Q8HR PRN #30 tablet


 PRN Reason: Muscle Spasm


Referrals: 


[facility] [Provider Group] - 3-5 Days


Forms:  Work/School Release Form(ED)


Time of Disposition: 19:09


Print Language: ENGLISH

## 2022-07-06 NOTE — XRAY REPORT
CHEST 2 VIEWS 



INDICATION / CLINICAL INFORMATION:

Chest Pain.



COMPARISON: 

11/12/2018



FINDINGS:



SUPPORT DEVICES: None.



HEART / MEDIASTINUM: No significant abnormality. 



LUNGS / PLEURA: The lungs are hyperinflated suggesting underlying emphysematous changes. No evidence 
for pneumonia, pleural effusion or pneumothorax. 



ADDITIONAL FINDINGS: No significant additional findings.



IMPRESSION:

1. No acute findings. Hyperinflated lungs.



Signer Name: Mariusz Carrasco Jr, MD 

Signed: 7/6/2022 2:39 PM

Workstation Name: [id number]